# Patient Record
Sex: FEMALE | Race: WHITE | NOT HISPANIC OR LATINO | Employment: FULL TIME | URBAN - NONMETROPOLITAN AREA
[De-identification: names, ages, dates, MRNs, and addresses within clinical notes are randomized per-mention and may not be internally consistent; named-entity substitution may affect disease eponyms.]

---

## 2017-01-26 RX ORDER — TEMAZEPAM 15 MG/1
CAPSULE ORAL
Refills: 0 | OUTPATIENT
Start: 2017-01-26

## 2017-02-21 ENCOUNTER — OFFICE VISIT (OUTPATIENT)
Dept: URGENT CARE | Facility: PHYSICIAN GROUP | Age: 52
End: 2017-02-21
Payer: COMMERCIAL

## 2017-02-21 VITALS
SYSTOLIC BLOOD PRESSURE: 110 MMHG | HEIGHT: 68 IN | TEMPERATURE: 97.6 F | RESPIRATION RATE: 14 BRPM | WEIGHT: 163 LBS | HEART RATE: 110 BPM | BODY MASS INDEX: 24.71 KG/M2 | DIASTOLIC BLOOD PRESSURE: 70 MMHG | OXYGEN SATURATION: 98 %

## 2017-02-21 DIAGNOSIS — G47.09 SECONDARY INSOMNIA: ICD-10-CM

## 2017-02-21 DIAGNOSIS — M10.071 ACUTE IDIOPATHIC GOUT OF RIGHT FOOT: ICD-10-CM

## 2017-02-21 PROCEDURE — 99214 OFFICE O/P EST MOD 30 MIN: CPT | Performed by: FAMILY MEDICINE

## 2017-02-21 RX ORDER — METHYLPREDNISOLONE 4 MG/1
4 TABLET ORAL DAILY
Qty: 1 KIT | Refills: 0 | Status: SHIPPED | OUTPATIENT
Start: 2017-02-21 | End: 2018-07-10

## 2017-02-21 RX ORDER — TRAZODONE HYDROCHLORIDE 150 MG/1
150 TABLET ORAL NIGHTLY PRN
Qty: 20 TAB | Refills: 0 | Status: SHIPPED | OUTPATIENT
Start: 2017-02-21 | End: 2018-07-10

## 2017-02-21 ASSESSMENT — ENCOUNTER SYMPTOMS
NAUSEA: 0
ABDOMINAL PAIN: 0
VOMITING: 0
FEVER: 0

## 2017-02-21 NOTE — MR AVS SNAPSHOT
"        Louisa Nieto   2017 1:00 PM   Office Visit   MRN: [id number]    Department:  [facility]   Dept Phone:  [phone]    Description:  Female : 1965   Provider:  Kane Barcenas M.D.           Reason for Visit     Gout R/ foot      Allergies as of 2017     Allergen Noted Reactions    Aspirin 06/15/2013         You were diagnosed with     Secondary insomnia   [253107]       Acute idiopathic gout of right foot   [1269282]         Vital Signs     Blood Pressure Pulse Temperature Respirations Height Weight    110/70 mmHg 110 36.4 °C (97.6 °F) 14 1.727 m (5' 8\") 73.936 kg (163 lb)    Body Mass Index Oxygen Saturation Smoking Status             24.79 kg/m2 98% Never Smoker          Basic Information     Date Of Birth Sex Race Ethnicity Preferred Language    1965 Female White Non- English      Problem List              ICD-10-CM Priority Class Noted - Resolved    HTN (hypertension) I10   10/2/2013 - Present    Asthma in adult J45.909   10/2/2013 - Present    Asthma exacerbation J45.901   2014 - Present    Insomnia G47.00   2014 - Present    Allergic rhinitis due to allergen J30.9   3/23/2015 - Present    Sinusitis, acute J01.90   3/23/2015 - Present    Dental infection K04.7   2016 - Present      Health Maintenance        Date Due Completion Dates    IMM DTaP/Tdap/Td Vaccine (1 - Tdap) 1986 ---    IMM PNEUMOCOCCAL 19-64 (ADULT) MEDIUM RISK SERIES (1 of 1 - PPSV23) 1986 ---    MAMMOGRAM 10/2/2012 10/2/2011 (Prv Comp)    Override on 10/2/2011: Previously completed    IMM INFLUENZA (1) 2016 ---    PAP SMEAR 10/2/2016 10/2/2013 (Prv Comp)    Override on 10/2/2013: Previously completed (s/p hysterectomy)    COLONOSCOPY 2017 ---            Current Immunizations     No immunizations on file.      Below and/or attached are the medications your provider expects you to take. Review all of your home medications and newly ordered medications with " your provider and/or pharmacist. Follow medication instructions as directed by your provider and/or pharmacist. Please keep your medication list with you and share with your provider. Update the information when medications are discontinued, doses are changed, or new medications (including over-the-counter products) are added; and carry medication information at all times in the event of emergency situations     Allergies:  ASPIRIN - (reactions not documented)               Medications  Valid as of: February 21, 2017 -  1:42 PM    Generic Name Brand Name Tablet Size Instructions for use    Albuterol Sulfate (Nebu Soln) PROVENTIL 2.5mg/3ml 3 mL by Nebulization route every four hours as needed for Shortness of Breath.        Albuterol Sulfate (Aero Soln) albuterol 108 (90 BASE) MCG/ACT Inhale 2 Puffs by mouth every 6 hours as needed for Shortness of Breath.        Albuterol Sulfate (Aero Soln) albuterol 108 (90 BASE) MCG/ACT Inhale 2 Puffs by mouth every four hours as needed.        Amoxicillin-Pot Clavulanate (Tab) AUGMENTIN 875-125 MG Take 1 Tab by mouth 2 times a day.        Azithromycin (Tab) ZITHROMAX 250 MG Follow package directions        Cetirizine HCl (Tab) ZYRTEC 10 MG TAKE 1 TABLET BY MOUTH EVERY DAY        Fluticasone-Salmeterol (AEROSOL POWDER, BREATH ACTIVATED) ADVAIR 500-50 MCG/DOSE Inhale 1 Puff by mouth 2 times a day.        Guaifenesin-Codeine (Solution) ROBITUSSIN -10 mg/5mL Take 5 mL by mouth every four hours as needed for Cough.        MethylPREDNISolone (Tablet Therapy Pack) MEDROL DOSEPAK 4 MG Take 1 Tab by mouth every day.        Montelukast Sodium (Tab) SINGULAIR 10 MG Take 1 Tab by mouth every day.        Temazepam (Cap) RESTORIL 15 MG TAKE 1 CAPSULE BY MOUTH EVERY NIGHT AT BEDTIME AS NEEDED FOR SLEEP        TraZODone HCl (Tab) DESYREL 150 MG Take 1 Tab by mouth at bedtime as needed for Sleep.        Valsartan-Hydrochlorothiazide (Tab) DIOVAN--25 MG Take 1 Tab by mouth every day.         .                 Medicines prescribed today were sent to:     [id number] - [place], [place] - [address] [facility] [address]    [address] [place] [address]    Phone: [phone] Fax: [phone]    Open 24 Hours?: No    [id number] - [place], [place] - [address]    [address] [place] [address]    Phone: [phone] Fax: [phone]    Open 24 Hours?: No      Medication refill instructions:       If your prescription bottle indicates you have medication refills left, it is not necessary to call your provider’s office. Please contact your pharmacy and they will refill your medication.    If your prescription bottle indicates you do not have any refills left, you may request refills at any time through one of the following ways: The online [employer] system (except Urgent Care), by calling your provider’s office, or by asking your pharmacy to contact your provider’s office with a refill request. Medication refills are processed only during regular business hours and may not be available until the next business day. Your provider may request additional information or to have a follow-up visit with you prior to refilling your medication.   *Please Note: Medication refills are assigned a new Rx number when refilled electronically. Your pharmacy may indicate that no refills were authorized even though a new prescription for the same medication is available at the pharmacy. Please request the medicine by name with the pharmacy before contacting your provider for a refill.           [employer] Access Code: [email or web address]  Expires: 2/28/2017 10:18 AM    [employer]  A secure, online tool to manage your health information     [employer]’s [employer]® is a secure, online tool that connects you to your personalized health information from the privacy of your home -- day or night - making it very easy for you to manage your healthcare. Once the  activation process is completed, you can even access your medical information using the [facility] justin, which is available for free in the Apple Justin store or Google Play store.     [facility] provides the following levels of access (as shown below):   My Chart Features   [facility] Doctor [facility]  Specialists [facility]  Urgent  Care Non-[facility]  Primary Care  Doctor   Email your healthcare team securely and privately 24/7 X X X    Manage appointments: schedule your next appointment; view details of past/upcoming appointments X      Request prescription refills. X      View recent personal medical records, including lab and immunizations X X X X   View health record, including health history, allergies, medications X X X X   Read reports about your outpatient visits, procedures, consult and ER notes X X X X   See your discharge summary, which is a recap of your hospital and/or ER visit that includes your diagnosis, lab results, and care plan. X X       How to register for [facility]:  1. Go to  [email or web address].  2. Click on the Sign Up Now box, which takes you to the New Member Sign Up page. You will need to provide the following information:  a. Enter your [facility] Access Code exactly as it appears at the top of this page. (You will not need to use this code after you’ve completed the sign-up process. If you do not sign up before the expiration date, you must request a new code.)   b. Enter your date of birth.   c. Enter your home email address.   d. Click Submit, and follow the next screen’s instructions.  3. Create a [facility] ID. This will be your [facility] login ID and cannot be changed, so think of one that is secure and easy to remember.  4. Create a [facility] password. You can change your password at any time.  5. Enter your Password Reset Question and Answer. This can be used at a later time if you forget your password.   6. Enter your e-mail address. This allows you to receive e-mail notifications when new  information is available in [employer].  7. Click Sign Up. You can now view your health information.    For assistance activating your [employer] account, call [phone]

## 2017-02-21 NOTE — PROGRESS NOTES
Subjective:      Louisa Nieto is a 52 y.o. female who presents with Gout            Foot Problem  This is a recurrent problem. The current episode started in the past 7 days (3 days). The problem has been gradually worsening. Associated symptoms include a rash. Pertinent negatives include no abdominal pain, chest pain, fever, nausea or vomiting. The symptoms are aggravated by walking and standing. Treatments tried: pain pill from dental work. The treatment provided mild relief.       Review of Systems   Constitutional: Negative for fever.   Cardiovascular: Negative for chest pain.   Gastrointestinal: Negative for nausea, vomiting and abdominal pain.   Skin: Positive for rash.     PMH:  has a past medical history of HTN (hypertension) (10/2/2013); Asthma in adult (10/2/2013); URI (upper respiratory infection) (10/2/2013); and Gout.  MEDS:   Current outpatient prescriptions:   •  temazepam (RESTORIL) 15 MG Cap, TAKE 1 CAPSULE BY MOUTH EVERY NIGHT AT BEDTIME AS NEEDED FOR SLEEP, Disp: 30 Cap, Rfl: 0  •  azithromycin (ZITHROMAX) 250 MG Tab, Follow package directions, Disp: 6 Tab, Rfl: 0  •  MethylPREDNISolone (MEDROL DOSEPAK) 4 MG Tablet Therapy Pack, Take 1 Tab by mouth See Admin Instructions., Disp: 21 Tab, Rfl: 0  •  guaifenesin-codeine (CHERATUSSIN AC) Solution oral solution, Take 5 mL by mouth every four hours as needed for Cough., Disp: 120 mL, Rfl: 0  •  albuterol 108 (90 BASE) MCG/ACT Aero Soln inhalation aerosol, Inhale 2 Puffs by mouth every four hours as needed., Disp: 1 Inhaler, Rfl: 0  •  amoxicillin-clavulanate (AUGMENTIN) 875-125 MG Tab, Take 1 Tab by mouth 2 times a day., Disp: 20 Tab, Rfl: 0  •  montelukast (SINGULAIR) 10 MG Tab, Take 1 Tab by mouth every day., Disp: 30 Tab, Rfl: 11  •  fluticasone-salmeterol (ADVAIR DISKUS) 500-50 MCG/DOSE AEROSOL POWDER, BREATH ACTIVATED, Inhale 1 Puff by mouth 2 times a day., Disp: 3 Inhaler, Rfl: 3  •  valsartan-hydrochlorothiazide (DIOVAN-HCT) 160-25 MG  "per tablet, Take 1 Tab by mouth every day., Disp: 90 Tab, Rfl: 3  •  albuterol (VENTOLIN OR PROVENTIL) 108 (90 BASE) MCG/ACT Aero Soln inhalation aerosol, Inhale 2 Puffs by mouth every 6 hours as needed for Shortness of Breath., Disp: 2 Inhaler, Rfl: 3  •  cetirizine (ZYRTEC) 10 MG TABS, TAKE 1 TABLET BY MOUTH EVERY DAY, Disp: 30 Tab, Rfl: 11  •  albuterol (PROVENTIL) 2.5mg/3ml NEBU solution for nebulization, 3 mL by Nebulization route every four hours as needed for Shortness of Breath., Disp: 150 mL, Rfl: 3  ALLERGIES:   Allergies   Allergen Reactions   • Aspirin      SURGHX:   Past Surgical History   Procedure Laterality Date   • Primary c section       x1   • Appendectomy       SOCHX:  reports that she has never smoked. She has never used smokeless tobacco. She reports that she does not drink alcohol or use illicit drugs.  FH: Family history was reviewed, no pertinent findings to report       Objective:     /70 mmHg  Pulse 110  Temp(Src) 36.4 °C (97.6 °F)  Resp 14  Ht 1.727 m (5' 8\")  Wt 73.936 kg (163 lb)  BMI 24.79 kg/m2  SpO2 98%     Physical Exam   Constitutional: She appears well-developed. No distress.   Pulmonary/Chest: Effort normal.   Musculoskeletal:        Right ankle: She exhibits decreased range of motion and swelling. She exhibits no ecchymosis. Tenderness. No lateral malleolus, no medial malleolus, no AITFL, no CF ligament, no posterior TFL and no head of 5th metatarsal tenderness found.        Feet:    Skin: Skin is warm and dry. She is not diaphoretic. No erythema.   Psychiatric: She has a normal mood and affect. Her behavior is normal.     Antalgic gait          Assessment/Plan:     1. Secondary insomnia  trazodone (DESYREL) 150 MG Tab   2. Acute idiopathic gout of right foot  MethylPREDNISolone (MEDROL DOSEPAK) 4 MG Tablet Therapy Pack     Insomnia since death of family member earlier this week  Trazodone for sleep  F/u with PCP     Supportive care  Follow-up if symptoms worsen or " fail to improve

## 2017-05-23 ENCOUNTER — TELEPHONE (OUTPATIENT)
Dept: MEDICAL GROUP | Facility: PHYSICIAN GROUP | Age: 52
End: 2017-05-23

## 2017-05-23 DIAGNOSIS — J45.41 ASTHMA IN ADULT, MODERATE PERSISTENT, WITH ACUTE EXACERBATION: ICD-10-CM

## 2017-05-23 DIAGNOSIS — I10 ESSENTIAL HYPERTENSION: ICD-10-CM

## 2017-05-23 RX ORDER — VALSARTAN AND HYDROCHLOROTHIAZIDE 160; 25 MG/1; MG/1
1 TABLET ORAL
Qty: 90 TAB | Refills: 0 | Status: SHIPPED | OUTPATIENT
Start: 2017-05-23 | End: 2018-07-10 | Stop reason: SDUPTHER

## 2017-10-04 NOTE — TELEPHONE ENCOUNTER
Pt was made aware 8/17 that she needed to establish with new PCP prior to more refills. Med is denied. Pharmacy already made aware, please let pt know.

## 2017-10-11 NOTE — TELEPHONE ENCOUNTER
Phone Number Called: [phone] (home)     Message: M for pt. To call and schedule to establish care. Provided [phone] number to schedule.     Left Message for patient to call back: N/A

## 2017-11-29 ENCOUNTER — OFFICE VISIT (OUTPATIENT)
Dept: URGENT CARE | Facility: PHYSICIAN GROUP | Age: 52
End: 2017-11-29
Payer: COMMERCIAL

## 2017-11-29 VITALS
RESPIRATION RATE: 18 BRPM | HEIGHT: 68 IN | HEART RATE: 94 BPM | BODY MASS INDEX: 25.01 KG/M2 | SYSTOLIC BLOOD PRESSURE: 130 MMHG | OXYGEN SATURATION: 97 % | DIASTOLIC BLOOD PRESSURE: 84 MMHG | WEIGHT: 165 LBS | TEMPERATURE: 98.3 F

## 2017-11-29 DIAGNOSIS — H66.92 ACUTE OTITIS MEDIA, LEFT: ICD-10-CM

## 2017-11-29 PROCEDURE — 99214 OFFICE O/P EST MOD 30 MIN: CPT | Performed by: PHYSICIAN ASSISTANT

## 2017-11-29 RX ORDER — AMOXICILLIN AND CLAVULANATE POTASSIUM 875; 125 MG/1; MG/1
1 TABLET, FILM COATED ORAL 2 TIMES DAILY
Qty: 20 TAB | Refills: 0 | Status: SHIPPED | OUTPATIENT
Start: 2017-11-29 | End: 2017-12-09

## 2017-11-29 NOTE — PROGRESS NOTES
Chief Complaint   Patient presents with   • Otalgia     possible sinusitis, ear pain w/ drainage.       HISTORY OF PRESENT ILLNESS: Patient is a 52 y.o. female who presents today because she has left ear pain. She has had some nasal and sinus congestion, has had a recent surgery on her sinuses.    Left ear has been giving her a lot of trouble and pain over the last week. She has been using over-the-counter anti-inflammatories, uses her Advair, over-the-counter decongestants and cough and cold medications without improvement    Patient Active Problem List    Diagnosis Date Noted   • Dental infection 04/19/2016   • Allergic rhinitis due to allergen 03/23/2015   • Sinusitis, acute 03/23/2015   • Asthma exacerbation 11/13/2014   • Insomnia 11/13/2014   • HTN (hypertension) 10/02/2013   • Asthma in adult 10/02/2013       Allergies:Aspirin    Current Outpatient Prescriptions Ordered in [facility]   Medication Sig Dispense Refill   • amoxicillin-clavulanate (AUGMENTIN) 875-125 MG Tab Take 1 Tab by mouth 2 times a day for 10 days. 20 Tab 0   • ADVAIR DISKUS 500-50 MCG/DOSE AEROSOL POWDER, BREATH ACTIVATED INHALE 1 PUFF BY MOUTH TWICE DAILY 60 Inhaler 0   • valsartan-hydrochlorothiazide (DIOVAN-HCT) 160-25 MG per tablet Take 1 Tab by mouth every day. 90 Tab 0   • albuterol (PROVENTIL) 2.5mg/3ml NEBU solution for nebulization 3 mL by Nebulization route every four hours as needed for Shortness of Breath. 150 mL 3   • MethylPREDNISolone (MEDROL DOSEPAK) 4 MG Tablet Therapy Pack Take 1 Tab by mouth every day. 1 Kit 0   • trazodone (DESYREL) 150 MG Tab Take 1 Tab by mouth at bedtime as needed for Sleep. 20 Tab 0   • temazepam (RESTORIL) 15 MG Cap TAKE 1 CAPSULE BY MOUTH EVERY NIGHT AT BEDTIME AS NEEDED FOR SLEEP 30 Cap 0   • azithromycin (ZITHROMAX) 250 MG Tab Follow package directions 6 Tab 0   • guaifenesin-codeine (CHERATUSSIN AC) Solution oral solution Take 5 mL by mouth every four hours as needed for Cough. 120 mL 0   • albuterol  "108 (90 BASE) MCG/ACT Aero Soln inhalation aerosol Inhale 2 Puffs by mouth every four hours as needed. 1 Inhaler 0   • amoxicillin-clavulanate (AUGMENTIN) 875-125 MG Tab Take 1 Tab by mouth 2 times a day. 20 Tab 0   • montelukast (SINGULAIR) 10 MG Tab Take 1 Tab by mouth every day. 30 Tab 11   • albuterol (VENTOLIN OR PROVENTIL) 108 (90 BASE) MCG/ACT Aero Soln inhalation aerosol Inhale 2 Puffs by mouth every 6 hours as needed for Shortness of Breath. 2 Inhaler 3   • cetirizine (ZYRTEC) 10 MG TABS TAKE 1 TABLET BY MOUTH EVERY DAY 30 Tab 11     No current [facility]-ordered facility-administered medications on file.        Past Medical History:   Diagnosis Date   • Asthma in adult 10/2/2013   • Gout    • HTN (hypertension) 10/2/2013   • URI (upper respiratory infection) 10/2/2013    X 2 weeks Thick nasal congestion Can't breathe       Social History   Substance Use Topics   • Smoking status: Never Smoker   • Smokeless tobacco: Never Used   • Alcohol use No       Family Status   Relation Status   • Father    • Paternal Grandfather      Family History   Problem Relation Age of Onset   • Heart Disease Father    • Cancer Father      renal   • Heart Disease Paternal Grandfather        ROS:  Review of Systems   Constitutional: Negative for fever, chills, weight loss and malaise/fatigue.   HENT:Positive for left ear pain,No nosebleeds,Positive nasal congestion, no sore throat and neck pain.    Eyes: Negative for blurred vision.   Respiratory: Positive for cough, no sputum production, shortness of breath and wheezing.    Cardiovascular: Negative for chest pain, palpitations, orthopnea and leg swelling.   Gastrointestinal: Negative for heartburn, nausea, vomiting and abdominal pain.   Genitourinary: Negative for dysuria, urgency and frequency.     Exam:  Blood pressure 130/84, pulse 94, temperature 36.8 °C (98.3 °F), resp. rate 18, height 1.727 m (5' 8\"), weight 74.8 kg (165 lb), SpO2 97 %.  General:  Well nourished, well developed " female in NAD  Head:Normocephalic, atraumatic  Eyes: PERRLA, EOM within normal limits, no conjunctival injection, no scleral icterus, visual fields and acuity grossly intact.  Ears: Normal shape and symmetry, no tenderness, no discharge. External canals are without any significant edema or erythema. Tympanic membrane on the left is erythematous, bulging and dull, landmarks are not visible, there is thick yellow fluid behind the tympanic membrane with bubbles visible. Tympanic membrane on the right is without any inflammation, no effusion. Gross auditory acuity is intact  Nose: Symmetrical without tenderness, no discharge. Nasal mucosa is edematous bilaterally  Mouth: reasonable hygiene, no erythema exudates or tonsillar enlargement.  Neck: no masses, range of motion within normal limits, no tracheal deviation. No obvious thyroid enlargement.  Pulmonary: chest is symmetrical with respiration, no wheezes, crackles, or rhonchi.  Cardiovascular: regular rate and rhythm without murmurs, rubs, or gallops.  Extremities: no clubbing, cyanosis, or edema.    Please note that this dictation was created using voice recognition software. I have made every reasonable attempt to correct obvious errors, but I expect that there are errors of grammar and possibly content that I did not discover before finalizing the note.    Assessment/Plan:  1. Acute otitis media, left  amoxicillin-clavulanate (AUGMENTIN) 875-125 MG Tab   tylenol or ibuprofen as tolerated    Followup with primary care in the next 7-10 days if not significantly improving, return to the urgent care or go to the emergency room sooner for any worsening of symptoms.

## 2018-07-10 ENCOUNTER — OFFICE VISIT (OUTPATIENT)
Dept: MEDICAL GROUP | Facility: PHYSICIAN GROUP | Age: 53
End: 2018-07-10

## 2018-07-10 VITALS
HEIGHT: 68 IN | DIASTOLIC BLOOD PRESSURE: 98 MMHG | TEMPERATURE: 97.5 F | RESPIRATION RATE: 20 BRPM | SYSTOLIC BLOOD PRESSURE: 142 MMHG | OXYGEN SATURATION: 95 % | WEIGHT: 161.2 LBS | HEART RATE: 102 BPM | BODY MASS INDEX: 24.43 KG/M2

## 2018-07-10 DIAGNOSIS — F51.01 PRIMARY INSOMNIA: ICD-10-CM

## 2018-07-10 DIAGNOSIS — J32.0 CHRONIC MAXILLARY SINUSITIS: ICD-10-CM

## 2018-07-10 DIAGNOSIS — I10 ESSENTIAL HYPERTENSION: ICD-10-CM

## 2018-07-10 PROBLEM — J33.9 NASAL POLYPS: Status: ACTIVE | Noted: 2018-07-10

## 2018-07-10 PROCEDURE — 99213 OFFICE O/P EST LOW 20 MIN: CPT | Performed by: FAMILY MEDICINE

## 2018-07-10 RX ORDER — TEMAZEPAM 15 MG/1
15 CAPSULE ORAL NIGHTLY PRN
Qty: 30 CAP | Refills: 0 | Status: SHIPPED | OUTPATIENT
Start: 2018-07-10 | End: 2018-08-09

## 2018-07-10 RX ORDER — VALSARTAN AND HYDROCHLOROTHIAZIDE 160; 25 MG/1; MG/1
1 TABLET ORAL
Qty: 90 TAB | Refills: 1 | Status: SHIPPED | OUTPATIENT
Start: 2018-07-10 | End: 2018-12-28 | Stop reason: SDUPTHER

## 2018-07-10 ASSESSMENT — PATIENT HEALTH QUESTIONNAIRE - PHQ9: CLINICAL INTERPRETATION OF PHQ2 SCORE: 0

## 2018-07-10 NOTE — ASSESSMENT & PLAN NOTE
Gets Kenalog shots every 4 months at ENT. Uses flonase, Zyrtec daily, in addition to nasal irrigation with saline. Can't smell, taste anything anymore.

## 2018-07-10 NOTE — PROGRESS NOTES
Complaint: Establish care.     Subjective:     Louisa Nieto is a 53 y.o. female here today for establish care.    HTN (hypertension)  Did not take her BP med this morning. Has cuff at home. Says she is normally compliant.  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 Does not like doctors or doctor offices.    Insomnia  Needs refill Restoril.    Chronic maxillary sinusitis  Gets Kenalog shots every 4 months at ENT. Uses flonase, Zyrtec daily, in addition to nasal irrigation with saline. Can't smell, taste anything anymore.      No other concerns or complaints today.    Current medicines (including changes today)  Current Outpatient Prescriptions   Medication Sig Dispense Refill   • hdvxzbn-jwicpkizgz-oar (ROBITUSSIN CF) 30- MG/5ML Liquid Take 10 mL by mouth every four hours as needed.     • temazepam (RESTORIL) 15 MG Cap Take 1 Cap by mouth at bedtime as needed for Sleep for up to 30 days. SLEEP 30 Cap 0   • valsartan-hydrochlorothiazide (DIOVAN-HCT) 160-25 MG per tablet Take 1 Tab by mouth every day. 90 Tab 1   • ADVAIR DISKUS 500-50 MCG/DOSE AEROSOL POWDER, BREATH ACTIVATED INHALE 1 PUFF BY MOUTH TWICE DAILY 60 Inhaler 0   • albuterol 108 (90 BASE) MCG/ACT Aero Soln inhalation aerosol Inhale 2 Puffs by mouth every four hours as needed. 1 Inhaler 0   • albuterol (VENTOLIN OR PROVENTIL) 108 (90 BASE) MCG/ACT Aero Soln inhalation aerosol Inhale 2 Puffs by mouth every 6 hours as needed for Shortness of Breath. 2 Inhaler 3   • cetirizine (ZYRTEC) 10 MG TABS TAKE 1 TABLET BY MOUTH EVERY DAY 30 Tab 11   • albuterol (PROVENTIL) 2.5mg/3ml NEBU solution for nebulization 3 mL by Nebulization route every four hours as needed for Shortness of Breath. 150 mL 3     No current facility-administered medications for this visit.      She  has a past medical history of Asthma in adult (10/2/2013); Chronic sinusitis; Gout; HTN (hypertension) (10/2/2013); Hypertension; and URI (upper respiratory infection)  (10/2/2013).    Health Maintenance: UTD      Allergies: Aspirin    Current Outpatient Prescriptions Ordered in [facility]   Medication Sig Dispense Refill   • fmqokez-jebjrpzcah-prv (ROBITUSSIN CF) 30- MG/5ML Liquid Take 10 mL by mouth every four hours as needed.     • temazepam (RESTORIL) 15 MG Cap Take 1 Cap by mouth at bedtime as needed for Sleep for up to 30 days. SLEEP 30 Cap 0   • valsartan-hydrochlorothiazide (DIOVAN-HCT) 160-25 MG per tablet Take 1 Tab by mouth every day. 90 Tab 1   • ADVAIR DISKUS 500-50 MCG/DOSE AEROSOL POWDER, BREATH ACTIVATED INHALE 1 PUFF BY MOUTH TWICE DAILY 60 Inhaler 0   • albuterol 108 (90 BASE) MCG/ACT Aero Soln inhalation aerosol Inhale 2 Puffs by mouth every four hours as needed. 1 Inhaler 0   • albuterol (VENTOLIN OR PROVENTIL) 108 (90 BASE) MCG/ACT Aero Soln inhalation aerosol Inhale 2 Puffs by mouth every 6 hours as needed for Shortness of Breath. 2 Inhaler 3   • cetirizine (ZYRTEC) 10 MG TABS TAKE 1 TABLET BY MOUTH EVERY DAY 30 Tab 11   • albuterol (PROVENTIL) 2.5mg/3ml NEBU solution for nebulization 3 mL by Nebulization route every four hours as needed for Shortness of Breath. 150 mL 3     No current [facility]-ordered facility-administered medications on file.        Past Medical History:   Diagnosis Date   • Asthma in adult 10/2/2013   • Chronic sinusitis    • Gout    • HTN (hypertension) 10/2/2013   • Hypertension    • URI (upper respiratory infection) 10/2/2013    X 2 weeks Thick nasal congestion Can't breathe       Past Surgical History:   Procedure Laterality Date   • APPENDECTOMY     • PRIMARY C SECTION      x1       Social History   Substance Use Topics   • Smoking status: Never Smoker   • Smokeless tobacco: Never Used   • Alcohol use No       Social History     Social History Narrative   • No narrative on file       Family History   Problem Relation Age of Onset   • Heart Disease Father    • Cancer Father      renal   • Heart Disease Paternal Grandfather          ROS  "Positive for nasal congestion, postnasal drip, hoarse voice.  Patient denies any fever, chills, unintentional weight gain/loss, fatigue, stroke symptoms, dizziness, headache, sore-throat, cough, heartburn, chest pain, difficulty breathing, abdominal discomfort, diarrhea/constipation, burning with urination or frequency, joint or back pain, skin rashes, depression or anxiety.       Objective:     Blood pressure 142/98, pulse (!) 102, temperature 36.4 °C (97.5 °F), resp. rate 20, height 1.727 m (5' 8\"), weight 73.1 kg (161 lb 3.2 oz), SpO2 95 %. Body mass index is 24.51 kg/m².   Physical Exam:  Constitutional: Alert, no distress.  Psych: Alert and oriented x3, appropriate affect and mood.        Assessment and Plan:   The following treatment plan was discussed    1. Primary insomnia  Chronic problem. Pt made aware for need to comply with a CS agreement and be tested for illicit drug use.  - temazepam (RESTORIL) 15 MG Cap; Take 1 Cap by mouth at bedtime as needed for Sleep for up to 30 days. SLEEP  Dispense: 30 Cap; Refill: 0  - CONTROLLED SUBSTANCE TREATMENT AGREEMENT  - [facility] PAIN MANAGEMENT SCREEN; Future    2. Essential hypertension  Chronic problem. Needs to keep below 140/90. Monitor at home.  - valsartan-hydrochlorothiazide (DIOVAN-HCT) 160-25 MG per tablet; Take 1 Tab by mouth every day.  Dispense: 90 Tab; Refill: 1    3. Chronic maxillary sinusitis  Chronic problem. Ok to get Kenalog shots with us.    Will see me in [place] in future.    Followup: Return in about 3 months (around 10/10/2018).    Please note that this dictation was created using voice recognition software. I have made every reasonable attempt to correct obvious errors, but I expect that there are errors of grammar and possibly content that I did not discover before finalizing the note.           "

## 2018-07-19 ENCOUNTER — DOCUMENTATION (OUTPATIENT)
Dept: MEDICAL GROUP | Facility: PHYSICIAN GROUP | Age: 53
End: 2018-07-19

## 2018-07-19 NOTE — PROGRESS NOTES
[facility] contacted regarding a recollect. It was damaged during transport.    I contacted the pt, advised of the new collection. She states she is currently working, , and wont be back home until late this month. She states she will do this at [facility] or [facility]. I advised her that is ok, just to do this within the next month.

## 2018-08-10 ENCOUNTER — TELEPHONE (OUTPATIENT)
Dept: MEDICAL GROUP | Facility: PHYSICIAN GROUP | Age: 53
End: 2018-08-10

## 2018-08-10 RX ORDER — TEMAZEPAM 15 MG/1
15 CAPSULE ORAL NIGHTLY PRN
COMMUNITY
End: 2018-09-04 | Stop reason: SDUPTHER

## 2018-08-10 RX ORDER — TEMAZEPAM 15 MG/1
15 CAPSULE ORAL NIGHTLY PRN
Qty: 30 CAP | Refills: 0 | OUTPATIENT
Start: 2018-08-10 | End: 2018-09-09

## 2018-08-10 NOTE — TELEPHONE ENCOUNTER
Was the patient seen in the last year in this department? Yes    Does patient have an active prescription for medications requested? Yes    Received Request Via: Patient       Last visit: 7/10/18  Last labs:n/a    Waiting for patient to provide millenium specimen.

## 2018-08-10 NOTE — TELEPHONE ENCOUNTER
Patient came in to provide a millennium screen. Patients sample was inadequate. Patient states that she will be by sometime next week to try again.

## 2018-08-15 ENCOUNTER — OFFICE VISIT (OUTPATIENT)
Dept: URGENT CARE | Facility: PHYSICIAN GROUP | Age: 53
End: 2018-08-15
Payer: COMMERCIAL

## 2018-08-15 ENCOUNTER — HOSPITAL ENCOUNTER (OUTPATIENT)
Dept: LAB | Facility: MEDICAL CENTER | Age: 53
End: 2018-08-15
Attending: PHYSICIAN ASSISTANT
Payer: COMMERCIAL

## 2018-08-15 VITALS
SYSTOLIC BLOOD PRESSURE: 122 MMHG | HEART RATE: 68 BPM | BODY MASS INDEX: 24.13 KG/M2 | DIASTOLIC BLOOD PRESSURE: 68 MMHG | OXYGEN SATURATION: 97 % | RESPIRATION RATE: 12 BRPM | WEIGHT: 159.2 LBS | HEIGHT: 68 IN | TEMPERATURE: 97.4 F

## 2018-08-15 DIAGNOSIS — R53.83 OTHER FATIGUE: ICD-10-CM

## 2018-08-15 DIAGNOSIS — R51.9 NONINTRACTABLE HEADACHE, UNSPECIFIED CHRONICITY PATTERN, UNSPECIFIED HEADACHE TYPE: ICD-10-CM

## 2018-08-15 LAB
ANION GAP SERPL CALC-SCNC: 10 MMOL/L (ref 0–11.9)
BASOPHILS # BLD AUTO: 0.9 % (ref 0–1.8)
BASOPHILS # BLD: 0.05 K/UL (ref 0–0.12)
BUN SERPL-MCNC: 8 MG/DL (ref 8–22)
CALCIUM SERPL-MCNC: 9.3 MG/DL (ref 8.5–10.5)
CHLORIDE SERPL-SCNC: 94 MMOL/L (ref 96–112)
CO2 SERPL-SCNC: 29 MMOL/L (ref 20–33)
CREAT SERPL-MCNC: 0.97 MG/DL (ref 0.5–1.4)
EOSINOPHIL # BLD AUTO: 0.15 K/UL (ref 0–0.51)
EOSINOPHIL NFR BLD: 2.6 % (ref 0–6.9)
ERYTHROCYTE [DISTWIDTH] IN BLOOD BY AUTOMATED COUNT: 41.3 FL (ref 35.9–50)
GLUCOSE SERPL-MCNC: 130 MG/DL (ref 65–99)
HCT VFR BLD AUTO: 41.7 % (ref 37–47)
HGB BLD-MCNC: 13.8 G/DL (ref 12–16)
LYMPHOCYTES # BLD AUTO: 1.69 K/UL (ref 1–4.8)
LYMPHOCYTES NFR BLD: 30.1 % (ref 22–41)
MANUAL DIFF BLD: NORMAL
MCH RBC QN AUTO: 28.7 PG (ref 27–33)
MCHC RBC AUTO-ENTMCNC: 33.1 G/DL (ref 33.6–35)
MCV RBC AUTO: 86.7 FL (ref 81.4–97.8)
MONOCYTES # BLD AUTO: 0.35 K/UL (ref 0–0.85)
MONOCYTES NFR BLD AUTO: 6.2 % (ref 0–13.4)
MORPHOLOGY BLD-IMP: NORMAL
NEUTROPHILS # BLD AUTO: 3.37 K/UL (ref 2–7.15)
NEUTROPHILS NFR BLD: 53.1 % (ref 44–72)
NEUTS BAND NFR BLD MANUAL: 7.1 % (ref 0–10)
NRBC # BLD AUTO: 0 K/UL
NRBC BLD-RTO: 0 /100 WBC
PLATELET # BLD AUTO: 256 K/UL (ref 164–446)
PLATELET BLD QL SMEAR: NORMAL
PMV BLD AUTO: 9.9 FL (ref 9–12.9)
POTASSIUM SERPL-SCNC: 3.3 MMOL/L (ref 3.6–5.5)
RBC # BLD AUTO: 4.81 M/UL (ref 4.2–5.4)
RBC BLD AUTO: PRESENT
SODIUM SERPL-SCNC: 133 MMOL/L (ref 135–145)
TSH SERPL DL<=0.005 MIU/L-ACNC: 2.05 UIU/ML (ref 0.38–5.33)
VARIANT LYMPHS BLD QL SMEAR: NORMAL
WBC # BLD AUTO: 5.6 K/UL (ref 4.8–10.8)

## 2018-08-15 PROCEDURE — 86664 EPSTEIN-BARR NUCLEAR ANTIGEN: CPT

## 2018-08-15 PROCEDURE — 99214 OFFICE O/P EST MOD 30 MIN: CPT | Performed by: PHYSICIAN ASSISTANT

## 2018-08-15 PROCEDURE — 36415 COLL VENOUS BLD VENIPUNCTURE: CPT

## 2018-08-15 PROCEDURE — 86665 EPSTEIN-BARR CAPSID VCA: CPT | Mod: 91

## 2018-08-15 PROCEDURE — 85027 COMPLETE CBC AUTOMATED: CPT

## 2018-08-15 PROCEDURE — 85007 BL SMEAR W/DIFF WBC COUNT: CPT

## 2018-08-15 PROCEDURE — 80048 BASIC METABOLIC PNL TOTAL CA: CPT

## 2018-08-15 PROCEDURE — 84443 ASSAY THYROID STIM HORMONE: CPT

## 2018-08-15 PROCEDURE — 86663 EPSTEIN-BARR ANTIBODY: CPT

## 2018-08-15 ASSESSMENT — ENCOUNTER SYMPTOMS
SCALP TENDERNESS: 0
VOMITING: 0
INSOMNIA: 0
ABNORMAL BEHAVIOR: 0
WEAKNESS: 0
ABDOMINAL PAIN: 0
LOSS OF BALANCE: 0
BLURRED VISION: 0
SORE THROAT: 0
EYE PAIN: 0
NERVOUS/ANXIOUS: 1
DIARRHEA: 0
WHEEZING: 0
EYE DISCHARGE: 0
NECK PAIN: 1
HEADACHES: 1
PHOTOPHOBIA: 0
CHILLS: 1
SINUS PRESSURE: 1
FALLS: 0
FEVER: 0
DOUBLE VISION: 0
EYE REDNESS: 0
COUGH: 0
SHORTNESS OF BREATH: 0

## 2018-08-15 NOTE — PROGRESS NOTES
Subjective:      Louisa Nieto is a 53 y.o. female who presents with Headache and Neck Pain            Patient is a 53-year-old female who presents with intermittent fatigue, hot flashes and intermittent headaches that began with the back of her head with radiation to the top portion of her head.  Patient reports the headaches usually are first thing in the morning however after taking one Tylenol this improves.  Patient denies any recent change to her vision, vomiting, diarrhea or noted rashes.  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  Patient was with her daughter today who also provides history.        Headache    This is a new problem. The current episode started 1 to 4 weeks ago. The problem occurs intermittently. The problem has been waxing and waning. The pain is located in the occipital region. Radiates to: Top of head. The pain quality is not similar to prior headaches. The quality of the pain is described as aching. The pain is moderate. Associated symptoms include neck pain and sinus pressure. Pertinent negatives include no abdominal pain, abnormal behavior, blurred vision, coughing, ear pain, eye pain, eye redness, fever, insomnia, loss of balance, photophobia, scalp tenderness, sore throat, vomiting or weakness. Associated symptoms comments: Positive for stress and fatigue.. Nothing aggravates the symptoms. She has tried acetaminophen for the symptoms. The treatment provided mild relief. Her past medical history is significant for sinus disease. There is no history of recent head traumas.   Neck Pain    Associated symptoms include headaches. Pertinent negatives include no fever, photophobia or weakness.       Review of Systems   Constitutional: Positive for chills and malaise/fatigue. Negative for fever.   HENT: Positive for sinus pressure.  "Negative for congestion, ear pain and sore throat.    Eyes: Negative for blurred vision, double vision, photophobia, pain, discharge and redness.   Respiratory: Negative for cough, shortness of breath and wheezing.    Gastrointestinal: Negative for abdominal pain, diarrhea and vomiting.   Musculoskeletal: Positive for neck pain. Negative for falls and joint pain.   Skin: Negative for itching and rash.   Neurological: Positive for headaches. Negative for weakness and loss of balance.   Psychiatric/Behavioral: The patient is nervous/anxious. The patient does not have insomnia.    All other systems reviewed and are negative.         Objective:     /68   Pulse 68   Temp 36.3 °C (97.4 °F)   Resp 12   Ht 1.727 m (5' 8\")   Wt 72.2 kg (159 lb 3.2 oz)   SpO2 97%   BMI 24.21 kg/m²    PMH:  has a past medical history of Asthma in adult (10/2/2013); Chronic sinusitis; Gout; HTN (hypertension) (10/2/2013); Hypertension; and URI (upper respiratory infection) (10/2/2013).  MEDS:   Current Outpatient Prescriptions:   •  valsartan-hydrochlorothiazide (DIOVAN-HCT) 160-25 MG per tablet, Take 1 Tab by mouth every day., Disp: 90 Tab, Rfl: 1  •  ADVAIR DISKUS 500-50 MCG/DOSE AEROSOL POWDER, BREATH ACTIVATED, INHALE 1 PUFF BY MOUTH TWICE DAILY, Disp: 60 Inhaler, Rfl: 0  •  temazepam (RESTORIL) 15 MG Cap, Take 15 mg by mouth at bedtime as needed for Sleep., Disp: , Rfl:   •  jrcqgza-lklsgbodmx-pzb (ROBITUSSIN CF) 30- MG/5ML Liquid, Take 10 mL by mouth every four hours as needed., Disp: , Rfl:   •  albuterol 108 (90 BASE) MCG/ACT Aero Soln inhalation aerosol, Inhale 2 Puffs by mouth every four hours as needed., Disp: 1 Inhaler, Rfl: 0  •  albuterol (VENTOLIN OR PROVENTIL) 108 (90 BASE) MCG/ACT Aero Soln inhalation aerosol, Inhale 2 Puffs by mouth every 6 hours as needed for Shortness of Breath., Disp: 2 Inhaler, Rfl: 3  •  cetirizine (ZYRTEC) 10 MG TABS, TAKE 1 TABLET BY MOUTH EVERY DAY, Disp: 30 Tab, Rfl: 11  •  " albuterol (PROVENTIL) 2.5mg/3ml NEBU solution for nebulization, 3 mL by Nebulization route every four hours as needed for Shortness of Breath., Disp: 150 mL, Rfl: 3  ALLERGIES:   Allergies   Allergen Reactions   • Aspirin      SURGHX:   Past Surgical History:   Procedure Laterality Date   • APPENDECTOMY     • PRIMARY C SECTION      x1     SOCHX:  reports that she has never smoked. She has never used smokeless tobacco. She reports that she does not drink alcohol or use drugs.  FH: Family history was reviewed, no pertinent findings to report    Physical Exam   Constitutional: She is oriented to person, place, and time. She appears well-developed and well-nourished.   HENT:   Head: Normocephalic and atraumatic.   Right Ear: External ear normal.   Left Ear: External ear normal.   Nose: Nose normal.   Mouth/Throat: Oropharynx is clear and moist. No oropharyngeal exudate.   Eyes: Pupils are equal, round, and reactive to light. EOM are normal.   Neck: Normal range of motion. Neck supple.   Cardiovascular: Normal rate and regular rhythm.    No murmur heard.  Pulmonary/Chest: Effort normal and breath sounds normal. No respiratory distress.   Musculoskeletal: Normal range of motion. She exhibits no edema.   Lymphadenopathy:     She has no cervical adenopathy.   Neurological: She is alert and oriented to person, place, and time. She displays normal reflexes. No cranial nerve deficit. She exhibits normal muscle tone. Coordination normal.   Neg. Finger to nose, neg. Pronator drift, neg. Rhomberg. CHANTAL's appropriate. Gait steady.    Skin: Skin is warm. No rash noted.   Psychiatric: She has a normal mood and affect. Her behavior is normal.   Vitals reviewed.              Assessment/Plan:     1. Other fatigue  - CBC WITH DIFFERENTIAL; Future  - TSH WITH REFLEX TO FT4; Future  - BASIC METABOLIC PANEL; Future  - EBV ACUTE INFECTION AB PANEL; Future    2. Nonintractable headache, unspecified chronicity pattern, unspecified headache  type      I discussed my concern with patient's new onset of new type of headaches.  CT imaging was discussed today for further evaluation-patient declined and would like to monitor symptoms at this time.  Patient was very nervous today being in clinic of which I encouraged her today to monitor symptoms, consider stress relief therapies.  I will order labs today and noted that it will take a few days for him to return.  Patient would like for me to contact her via my chart.  Patient exam is completely benign today although will rule out anemia, abnormalities with patient's thyroid other electrolyte abnormalities are patient was specifically worried about mono or Kaelyn-Barr today.  Will order such and will follow up with this patient.    Patient given precautionary s/sx that mandate immediate follow up and evaluation in the ED. Advised of risks of not doing so.    DDX, Supportive care, and indications for immediate follow-up discussed with patient.    Instructed to return to clinic or nearest emergency department if we are not available for any change in condition, further concerns, or worsening of symptoms.    The patient demonstrated a good understanding and agreed with the treatment plan.  Please note that this dictation was created using voice recognition software. I have made every reasonable attempt to correct obvious errors, but I expect that there are errors of grammar and possibly content that I did not discover before finalizing the note.

## 2018-08-17 LAB
EBV EA-D IGG SER-ACNC: <5 U/ML (ref 0–10.9)
EBV NA IGG SER IA-ACNC: 502 U/ML (ref 0–21.9)
EBV VCA IGG SER IA-ACNC: 560 U/ML (ref 0–21.9)
EBV VCA IGM SER IA-ACNC: 60.2 U/ML (ref 0–43.9)

## 2018-10-03 ENCOUNTER — TELEPHONE (OUTPATIENT)
Dept: MEDICAL GROUP | Facility: PHYSICIAN GROUP | Age: 53
End: 2018-10-03

## 2018-10-03 NOTE — TELEPHONE ENCOUNTER
Called and informed patient in order for her to get her medication refilled she would need to come in for a visit.she stated she would call back to schedule.

## 2018-11-02 ENCOUNTER — OFFICE VISIT (OUTPATIENT)
Dept: MEDICAL GROUP | Facility: CLINIC | Age: 53
End: 2018-11-02
Payer: COMMERCIAL

## 2018-11-02 VITALS
RESPIRATION RATE: 14 BRPM | HEART RATE: 114 BPM | OXYGEN SATURATION: 94 % | WEIGHT: 153 LBS | BODY MASS INDEX: 23.19 KG/M2 | DIASTOLIC BLOOD PRESSURE: 78 MMHG | SYSTOLIC BLOOD PRESSURE: 118 MMHG | TEMPERATURE: 98.6 F | HEIGHT: 68 IN

## 2018-11-02 DIAGNOSIS — F51.01 PRIMARY INSOMNIA: ICD-10-CM

## 2018-11-02 DIAGNOSIS — J06.9 VIRAL UPPER RESPIRATORY TRACT INFECTION: ICD-10-CM

## 2018-11-02 PROCEDURE — 99213 OFFICE O/P EST LOW 20 MIN: CPT | Performed by: FAMILY MEDICINE

## 2018-11-02 RX ORDER — TEMAZEPAM 15 MG/1
15 CAPSULE ORAL NIGHTLY PRN
COMMUNITY
End: 2018-11-02 | Stop reason: SDUPTHER

## 2018-11-02 RX ORDER — AZITHROMYCIN 250 MG/1
TABLET, FILM COATED ORAL
Qty: 6 TAB | Refills: 0 | Status: SHIPPED | OUTPATIENT
Start: 2018-11-02 | End: 2019-02-13

## 2018-11-02 RX ORDER — TEMAZEPAM 15 MG/1
15 CAPSULE ORAL NIGHTLY PRN
Qty: 30 CAP | Refills: 0 | Status: SHIPPED | OUTPATIENT
Start: 2019-01-02 | End: 2018-12-06 | Stop reason: SDUPTHER

## 2018-11-02 RX ORDER — TEMAZEPAM 15 MG/1
15 CAPSULE ORAL NIGHTLY PRN
Qty: 30 CAP | Refills: 0 | Status: SHIPPED | OUTPATIENT
Start: 2018-11-02 | End: 2018-11-02 | Stop reason: SDUPTHER

## 2018-11-02 RX ORDER — TEMAZEPAM 15 MG/1
15 CAPSULE ORAL NIGHTLY PRN
Qty: 30 CAP | Refills: 0 | Status: SHIPPED | OUTPATIENT
Start: 2018-12-02 | End: 2018-11-02 | Stop reason: SDUPTHER

## 2018-11-02 NOTE — ASSESSMENT & PLAN NOTE
Has been battling sinus infection for past few weeks. Thick nasal discharge, postnasal drip, cough productive of discolored mucus. Has not need her asthma meds more, no increase in wheezing.

## 2018-11-02 NOTE — PROGRESS NOTES
Complaint: Refill Restoril, on-going sinus infection.     Subjective:     Louisa Nieto is a 53 y.o. female here today for f/u. Failed drug test at work because she forgot to disclose her temazepam. Working again after being cleared.    Insomnia  Here for refill temazepam. Take intermittently for shift work. .     Viral upper respiratory tract infection  Has been battling sinus infection for past few weeks. Thick nasal discharge, postnasal drip, cough productive of discolored mucus. Has not need her asthma meds more, no increase in wheezing.     No other concerns or complaints.    Current medicines (including changes today)  Current Outpatient Prescriptions   Medication Sig Dispense Refill   • [START ON 1/2/2019] temazepam (RESTORIL) 15 MG Cap Take 1 Cap by mouth at bedtime as needed for Sleep for up to 30 days. 30 Cap 0   • azithromycin (ZITHROMAX) 250 MG Tab Take as directed 6 Tab 0   • valsartan-hydrochlorothiazide (DIOVAN-HCT) 160-25 MG per tablet Take 1 Tab by mouth every day. 90 Tab 1   • ADVAIR DISKUS 500-50 MCG/DOSE AEROSOL POWDER, BREATH ACTIVATED INHALE 1 PUFF BY MOUTH TWICE DAILY 60 Inhaler 0   • cetirizine (ZYRTEC) 10 MG TABS TAKE 1 TABLET BY MOUTH EVERY DAY 30 Tab 11   • albuterol (VENTOLIN OR PROVENTIL) 108 (90 BASE) MCG/ACT Aero Soln inhalation aerosol Inhale 2 Puffs by mouth every 6 hours as needed for Shortness of Breath. 2 Inhaler 3   • albuterol (PROVENTIL) 2.5mg/3ml NEBU solution for nebulization 3 mL by Nebulization route every four hours as needed for Shortness of Breath. 150 mL 3     No current facility-administered medications for this visit.      She  has a past medical history of Asthma in adult (10/2/2013); Chronic sinusitis; Gout; HTN (hypertension) (10/2/2013); Hypertension; and URI (upper respiratory infection) (10/2/2013).    Health Maintenance: UTD      Allergies: Aspirin    Current Outpatient Prescriptions Ordered in [facility]   Medication Sig Dispense Refill   • [START ON  1/2/2019] temazepam (RESTORIL) 15 MG Cap Take 1 Cap by mouth at bedtime as needed for Sleep for up to 30 days. 30 Cap 0   • azithromycin (ZITHROMAX) 250 MG Tab Take as directed 6 Tab 0   • valsartan-hydrochlorothiazide (DIOVAN-HCT) 160-25 MG per tablet Take 1 Tab by mouth every day. 90 Tab 1   • ADVAIR DISKUS 500-50 MCG/DOSE AEROSOL POWDER, BREATH ACTIVATED INHALE 1 PUFF BY MOUTH TWICE DAILY 60 Inhaler 0   • cetirizine (ZYRTEC) 10 MG TABS TAKE 1 TABLET BY MOUTH EVERY DAY 30 Tab 11   • albuterol (VENTOLIN OR PROVENTIL) 108 (90 BASE) MCG/ACT Aero Soln inhalation aerosol Inhale 2 Puffs by mouth every 6 hours as needed for Shortness of Breath. 2 Inhaler 3   • albuterol (PROVENTIL) 2.5mg/3ml NEBU solution for nebulization 3 mL by Nebulization route every four hours as needed for Shortness of Breath. 150 mL 3     No current [facility]-ordered facility-administered medications on file.        Past Medical History:   Diagnosis Date   • Asthma in adult 10/2/2013   • Chronic sinusitis    • Gout    • HTN (hypertension) 10/2/2013   • Hypertension    • URI (upper respiratory infection) 10/2/2013    X 2 weeks Thick nasal congestion Can't breathe       Past Surgical History:   Procedure Laterality Date   • APPENDECTOMY     • PRIMARY C SECTION      x1       Social History   Substance Use Topics   • Smoking status: Never Smoker   • Smokeless tobacco: Never Used   • Alcohol use No       Social History     Social History Narrative   • No narrative on file       Family History   Problem Relation Age of Onset   • Heart Disease Father    • Cancer Father         renal   • Heart Disease Paternal Grandfather          ROS Positive for difficulty sleeping after night shifts, sinus congestion, postnasal drip, hoarseness.  Patient denies any fever, chills, unintentional weight gain/loss, fatigue, stroke symptoms, dizziness, headache, sore-throat, heartburn, chest pain, difficulty breathing, abdominal discomfort, diarrhea/constipation, burning with  "urination or frequency, joint or back pain, skin rashes, depression or anxiety.       Objective:     Blood pressure 118/78, pulse (!) 114, temperature 37 °C (98.6 °F), resp. rate 14, height 1.727 m (5' 8\"), weight 69.4 kg (153 lb), SpO2 94 %. Body mass index is 23.26 kg/m².   Physical Exam:  Constitutional: Alert, no distress. Hoarse voice.  Skin: Warm, dry, good turgor, no rashes in visible areas.  Eye: Equal, round and reactive, conjunctiva clear, lids normal.  ENMT: Lips without lesions, good dentition, oropharynx clear. Nares congested, scanty thick secretions.  Neck: Trachea midline, no masses, no thyromegaly. No cervical or supraclavicular lymphadenopathy  Respiratory: Unlabored respiratory effort, lungs clear to auscultation, no wheezes, no ronchi.  Cardiovascular: Normal S1, S2, no murmur, no extremity edema.  Psych: Alert and oriented x3, appropriate affect and mood.        Assessment and Plan:   The following treatment plan was discussed    1. Primary insomnia  Chronic problem.  - temazepam (RESTORIL) 15 MG Cap; Take 1 Cap by mouth at bedtime as needed for Sleep for up to 30 days.  Dispense: 30 Cap; Refill: 2 refills dated.    2. Viral upper respiratory tract infection/sinusitis  Ongoing problem. Recommend otc Mucinex DM, push fluids. Continue Zyrtec  - azithromycin (ZITHROMAX) 250 MG Tab; Take as directed  Dispense: 6 Tab; Refill: 0      Followup: Return in about 3 months (around 2/2/2019).    Please note that this dictation was created using voice recognition software. I have made every reasonable attempt to correct obvious errors, but I expect that there are errors of grammar and possibly content that I did not discover before finalizing the note.           "

## 2018-12-06 DIAGNOSIS — F51.01 PRIMARY INSOMNIA: ICD-10-CM

## 2018-12-06 RX ORDER — TEMAZEPAM 15 MG/1
15 CAPSULE ORAL NIGHTLY PRN
Qty: 30 CAP | Refills: 0 | Status: SHIPPED | OUTPATIENT
Start: 2019-01-02 | End: 2019-01-08 | Stop reason: SDUPTHER

## 2018-12-06 NOTE — TELEPHONE ENCOUNTER
Was the patient seen in the last year in this department? Yes    Does patient have an active prescription for medications requested? Yes    Received Request Via: Pharmacy    Last Visit: 11/2/18  Last Lab: 8/15/18

## 2019-01-08 DIAGNOSIS — F51.01 PRIMARY INSOMNIA: ICD-10-CM

## 2019-01-08 RX ORDER — TEMAZEPAM 15 MG/1
15 CAPSULE ORAL NIGHTLY PRN
Qty: 30 CAP | Refills: 0 | Status: SHIPPED | OUTPATIENT
Start: 2019-01-08 | End: 2019-02-07

## 2019-02-13 ENCOUNTER — OFFICE VISIT (OUTPATIENT)
Dept: MEDICAL GROUP | Facility: CLINIC | Age: 54
End: 2019-02-13
Payer: COMMERCIAL

## 2019-02-13 VITALS
RESPIRATION RATE: 14 BRPM | TEMPERATURE: 98 F | SYSTOLIC BLOOD PRESSURE: 122 MMHG | BODY MASS INDEX: 23.54 KG/M2 | OXYGEN SATURATION: 92 % | WEIGHT: 150 LBS | HEART RATE: 113 BPM | HEIGHT: 67 IN | DIASTOLIC BLOOD PRESSURE: 78 MMHG

## 2019-02-13 DIAGNOSIS — J06.9 VIRAL UPPER RESPIRATORY TRACT INFECTION: ICD-10-CM

## 2019-02-13 DIAGNOSIS — J32.0 CHRONIC MAXILLARY SINUSITIS: ICD-10-CM

## 2019-02-13 DIAGNOSIS — F51.01 PRIMARY INSOMNIA: ICD-10-CM

## 2019-02-13 DIAGNOSIS — R05.9 COUGH: ICD-10-CM

## 2019-02-13 PROCEDURE — 99213 OFFICE O/P EST LOW 20 MIN: CPT | Performed by: FAMILY MEDICINE

## 2019-02-13 RX ORDER — TEMAZEPAM 15 MG/1
15 CAPSULE ORAL NIGHTLY PRN
Qty: 30 CAP | Refills: 0 | Status: SHIPPED | OUTPATIENT
Start: 2019-02-13 | End: 2019-02-13 | Stop reason: SDUPTHER

## 2019-02-13 RX ORDER — BENZONATATE 100 MG/1
100 CAPSULE ORAL 3 TIMES DAILY PRN
Qty: 60 CAP | Refills: 0 | Status: SHIPPED | OUTPATIENT
Start: 2019-02-13 | End: 2019-03-29

## 2019-02-13 RX ORDER — TEMAZEPAM 15 MG/1
15 CAPSULE ORAL NIGHTLY PRN
Qty: 30 CAP | Refills: 0 | Status: SHIPPED | OUTPATIENT
Start: 2019-03-13 | End: 2019-02-13 | Stop reason: SDUPTHER

## 2019-02-13 RX ORDER — TEMAZEPAM 15 MG/1
15 CAPSULE ORAL NIGHTLY PRN
Qty: 30 CAP | Refills: 0 | Status: SHIPPED | OUTPATIENT
Start: 2019-04-13 | End: 2019-05-13

## 2019-02-13 RX ORDER — AZITHROMYCIN 250 MG/1
TABLET, FILM COATED ORAL
Qty: 6 TAB | Refills: 0 | Status: SHIPPED | OUTPATIENT
Start: 2019-02-13 | End: 2019-03-29

## 2019-02-13 NOTE — PROGRESS NOTES
Complaint: Cough, refill temazepam     Subjective:     Louisa Nieto is a 54 y.o. female here today for f/u.    Cough  Coughing up thick phelgm, hurts to breath. Got Kenalog shot at allergist's last week. Taking Robitussin without much relief. Wants antibiotic again.    Insomnia  Needs refill temazepam. Tries not to take a couple days/week, says it otherwise doesn't work when she needs it.Also, gave inadequate urine sample for Millenium in recent past, understands she needs to repeat.         Current medicines (including changes today)  Current Outpatient Prescriptions   Medication Sig Dispense Refill   • [START ON 4/13/2019] temazepam (RESTORIL) 15 MG Cap Take 1 Cap by mouth at bedtime as needed for Sleep for up to 30 doses. 30 Cap 0   • benzonatate (TESSALON) 100 MG Cap Take 1 Cap by mouth 3 times a day as needed for Cough. 60 Cap 0   • azithromycin (ZITHROMAX) 250 MG Tab Take as directed 6 Tab 0   • valsartan-hydrochlorothiazide (DIOVAN-HCT) 160-25 MG per tablet TAKE 1 TABLET BY MOUTH EVERY DAY 90 Tab 0   • ADVAIR DISKUS 500-50 MCG/DOSE AEROSOL POWDER, BREATH ACTIVATED INHALE 1 PUFF BY MOUTH TWICE DAILY 60 Inhaler 0   • albuterol (VENTOLIN OR PROVENTIL) 108 (90 BASE) MCG/ACT Aero Soln inhalation aerosol Inhale 2 Puffs by mouth every 6 hours as needed for Shortness of Breath. 2 Inhaler 3   • cetirizine (ZYRTEC) 10 MG TABS TAKE 1 TABLET BY MOUTH EVERY DAY 30 Tab 11   • albuterol (PROVENTIL) 2.5mg/3ml NEBU solution for nebulization 3 mL by Nebulization route every four hours as needed for Shortness of Breath. 150 mL 3     No current facility-administered medications for this visit.      She  has a past medical history of Asthma in adult (10/2/2013); Chronic sinusitis; Gout; HTN (hypertension) (10/2/2013); Hypertension; and URI (upper respiratory infection) (10/2/2013).    Health Maintenance: UTD      Allergies: Aspirin    Current Outpatient Prescriptions Ordered in [employer]   Medication Sig Dispense Refill   •  [START ON 4/13/2019] temazepam (RESTORIL) 15 MG Cap Take 1 Cap by mouth at bedtime as needed for Sleep for up to 30 doses. 30 Cap 0   • benzonatate (TESSALON) 100 MG Cap Take 1 Cap by mouth 3 times a day as needed for Cough. 60 Cap 0   • azithromycin (ZITHROMAX) 250 MG Tab Take as directed 6 Tab 0   • valsartan-hydrochlorothiazide (DIOVAN-HCT) 160-25 MG per tablet TAKE 1 TABLET BY MOUTH EVERY DAY 90 Tab 0   • ADVAIR DISKUS 500-50 MCG/DOSE AEROSOL POWDER, BREATH ACTIVATED INHALE 1 PUFF BY MOUTH TWICE DAILY 60 Inhaler 0   • albuterol (VENTOLIN OR PROVENTIL) 108 (90 BASE) MCG/ACT Aero Soln inhalation aerosol Inhale 2 Puffs by mouth every 6 hours as needed for Shortness of Breath. 2 Inhaler 3   • cetirizine (ZYRTEC) 10 MG TABS TAKE 1 TABLET BY MOUTH EVERY DAY 30 Tab 11   • albuterol (PROVENTIL) 2.5mg/3ml NEBU solution for nebulization 3 mL by Nebulization route every four hours as needed for Shortness of Breath. 150 mL 3     No current [facility]-ordered facility-administered medications on file.        Past Medical History:   Diagnosis Date   • Asthma in adult 10/2/2013   • Chronic sinusitis    • Gout    • HTN (hypertension) 10/2/2013   • Hypertension    • URI (upper respiratory infection) 10/2/2013    X 2 weeks Thick nasal congestion Can't breathe       Past Surgical History:   Procedure Laterality Date   • APPENDECTOMY     • PRIMARY C SECTION      x1       Social History   Substance Use Topics   • Smoking status: Never Smoker   • Smokeless tobacco: Never Used   • Alcohol use No       Social History     Social History Narrative   • No narrative on file       Family History   Problem Relation Age of Onset   • Heart Disease Father    • Cancer Father         renal   • Heart Disease Paternal Grandfather          ROS Positive for productive cough, shortness of breath, nasal congestion, trouble sleeping.  Patient denies any fever, chills, unintentional weight gain/loss, fatigue, stroke symptoms, dizziness, headache, sore-throat,  "cough, heartburn, chest pain,  abdominal discomfort, diarrhea/constipation, burning with urination or frequency, joint or back pain, skin rashes, depression or anxiety.       Objective:     Blood pressure 122/78, pulse (!) 113, temperature 36.7 °C (98 °F), temperature source Temporal, resp. rate 14, height 1.702 m (5' 7\"), weight 68 kg (150 lb), SpO2 92 %. Body mass index is 23.49 kg/m².   Physical Exam:  Constitutional: Alert, no distress.  Eye: Equal, round and reactive, conjunctiva clear, lids normal.  ENMT: Lips without lesions, good dentition, oropharynx clear.  Neck: Trachea midline, no masses, no thyromegaly. No cervical or supraclavicular lymphadenopathy  Respiratory: Unlabored respiratory effort, lungs clear to auscultation, no wheezes, positive for ronchi.  Cardiovascular: Normal S1, S2, no murmur, no extremity edema.          Assessment and Plan:   The following treatment plan was discussed    1. Primary insomnia  Chronic problem.  - temazepam (RESTORIL) 15 MG Cap; Take 1 Cap by mouth at bedtime as needed for Sleep for up to 30 doses.  Dispense: 30 Cap; Refill: 0  - Pain Management Screen; Future    2. Cough  Encouraged to take otc Mucinex as well. Push fluids to loosen secretions.  - benzonatate (TESSALON) 100 MG Cap; Take 1 Cap by mouth 3 times a day as needed for Cough.  Dispense: 60 Cap; Refill: 0  - azithromycin (ZITHROMAX) 250 MG Tab; Take as directed  Dispense: 6 Tab; Refill: 0    Demonstrated proper use of Pulmicort Turbohaler.    Followup: Return in about 3 months (around 5/13/2019).    Please note that this dictation was created using voice recognition software. I have made every reasonable attempt to correct obvious errors, but I expect that there are errors of grammar and possibly content that I did not discover before finalizing the note.           "

## 2019-02-13 NOTE — ASSESSMENT & PLAN NOTE
Coughing up thick phelgm, hurts to breath. Got Kenalog shot at allergist's last week. Taking Robitussin without much relief. Wants antibiotic again.

## 2019-02-13 NOTE — ASSESSMENT & PLAN NOTE
Needs refill temazepam. Also, gave inadequate urine sample for Millenium in recent past, understands she needs to repeat.

## 2019-03-29 ENCOUNTER — OFFICE VISIT (OUTPATIENT)
Dept: MEDICAL GROUP | Facility: CLINIC | Age: 54
End: 2019-03-29
Payer: COMMERCIAL

## 2019-03-29 ENCOUNTER — HOSPITAL ENCOUNTER (OUTPATIENT)
Facility: MEDICAL CENTER | Age: 54
End: 2019-03-29
Attending: FAMILY MEDICINE
Payer: COMMERCIAL

## 2019-03-29 VITALS
DIASTOLIC BLOOD PRESSURE: 78 MMHG | OXYGEN SATURATION: 94 % | TEMPERATURE: 97.3 F | SYSTOLIC BLOOD PRESSURE: 122 MMHG | RESPIRATION RATE: 14 BRPM | WEIGHT: 153 LBS | BODY MASS INDEX: 24.01 KG/M2 | HEIGHT: 67 IN | HEART RATE: 108 BPM

## 2019-03-29 DIAGNOSIS — K64.8 OTHER HEMORRHOIDS: ICD-10-CM

## 2019-03-29 DIAGNOSIS — F51.01 PRIMARY INSOMNIA: ICD-10-CM

## 2019-03-29 DIAGNOSIS — R30.0 DYSURIA: ICD-10-CM

## 2019-03-29 DIAGNOSIS — I10 ESSENTIAL HYPERTENSION: ICD-10-CM

## 2019-03-29 PROBLEM — R05.9 COUGH: Status: RESOLVED | Noted: 2019-02-13 | Resolved: 2019-03-29

## 2019-03-29 LAB
APPEARANCE UR: CLEAR
BILIRUB UR STRIP-MCNC: NORMAL MG/DL
COLOR UR AUTO: YELLOW
GLUCOSE UR STRIP.AUTO-MCNC: NORMAL MG/DL
KETONES UR STRIP.AUTO-MCNC: NORMAL MG/DL
LEUKOCYTE ESTERASE UR QL STRIP.AUTO: NORMAL
NITRITE UR QL STRIP.AUTO: NORMAL
PH UR STRIP.AUTO: 7 [PH] (ref 5–8)
PROT UR QL STRIP: NORMAL MG/DL
RBC UR QL AUTO: NORMAL
SP GR UR STRIP.AUTO: 1.01
UROBILINOGEN UR STRIP-MCNC: NORMAL MG/DL

## 2019-03-29 PROCEDURE — 87491 CHLMYD TRACH DNA AMP PROBE: CPT

## 2019-03-29 PROCEDURE — 81002 URINALYSIS NONAUTO W/O SCOPE: CPT | Performed by: FAMILY MEDICINE

## 2019-03-29 PROCEDURE — 87591 N.GONORRHOEAE DNA AMP PROB: CPT

## 2019-03-29 PROCEDURE — 99214 OFFICE O/P EST MOD 30 MIN: CPT | Performed by: FAMILY MEDICINE

## 2019-03-29 RX ORDER — HYDROCORTISONE ACETATE 25 MG/1
25 SUPPOSITORY RECTAL EVERY 12 HOURS
Qty: 10 SUPPOSITORY | Refills: 0 | Status: SHIPPED | OUTPATIENT
Start: 2019-03-29 | End: 2019-06-14

## 2019-03-29 RX ORDER — VALSARTAN AND HYDROCHLOROTHIAZIDE 160; 25 MG/1; MG/1
1 TABLET ORAL
Qty: 90 TAB | Refills: 1 | Status: SHIPPED | OUTPATIENT
Start: 2019-03-29 | End: 2020-01-03 | Stop reason: SDUPTHER

## 2019-03-29 RX ORDER — TRAZODONE HYDROCHLORIDE 50 MG/1
TABLET ORAL
Qty: 30 TAB | Refills: 2 | Status: SHIPPED | OUTPATIENT
Start: 2019-03-29 | End: 2019-06-14

## 2019-03-29 ASSESSMENT — PATIENT HEALTH QUESTIONNAIRE - PHQ9: CLINICAL INTERPRETATION OF PHQ2 SCORE: 0

## 2019-03-29 NOTE — ASSESSMENT & PLAN NOTE
Having recurrent anal discomfort, some bleeding after BM's since last October. Has had hemorrhoids since delivery of her daughter 20 years ago.

## 2019-03-29 NOTE — ASSESSMENT & PLAN NOTE
Has been having some burning with urination. Had dip done at [facility], cx negative. Would like to be retested, tested for STD as well.

## 2019-03-29 NOTE — PROGRESS NOTES
Complaint: Hemorrhoids     Subjective:     Louisa Nieto is a 54 y.o. female here today with a couple new ailments.    Other hemorrhoids  Having recurrent anal discomfort, some bleeding and greenish d/c after BM's since last October. Has had hemorrhoids since delivery of her daughter 20 years ago.    Dysuria  Has been having some burning with urination. Had dip done at [facility], cx negative. Would like to be retested, tested for STD as well.    Insomnia  Restoril helps her get to sleep, but doesn't keep her asleep. Racing thoughts.      Needs refill Hyzaar.    Current medicines (including changes today)  Current Outpatient Prescriptions   Medication Sig Dispense Refill   • hydrocortisone (ANUSOL-HC) 25 MG Suppos Insert 1 Suppository in rectum every 12 hours. 10 Suppository 0   • valsartan-hydrochlorothiazide (DIOVAN-HCT) 160-25 MG per tablet Take 1 Tab by mouth every day. 90 Tab 1   • traZODone (DESYREL) 50 MG Tab Take with temazepam for sleep 30 Tab 2   • [START ON 4/13/2019] temazepam (RESTORIL) 15 MG Cap Take 1 Cap by mouth at bedtime as needed for Sleep for up to 30 doses. 30 Cap 0   • ADVAIR DISKUS 500-50 MCG/DOSE AEROSOL POWDER, BREATH ACTIVATED INHALE 1 PUFF BY MOUTH TWICE DAILY 60 Inhaler 0   • cetirizine (ZYRTEC) 10 MG TABS TAKE 1 TABLET BY MOUTH EVERY DAY 30 Tab 11   • albuterol (PROVENTIL) 2.5mg/3ml NEBU solution for nebulization 3 mL by Nebulization route every four hours as needed for Shortness of Breath. 150 mL 3   • albuterol (VENTOLIN OR PROVENTIL) 108 (90 BASE) MCG/ACT Aero Soln inhalation aerosol Inhale 2 Puffs by mouth every 6 hours as needed for Shortness of Breath. (Patient not taking: Reported on 3/29/2019) 2 Inhaler 3     No current facility-administered medications for this visit.      She  has a past medical history of Asthma in adult (10/2/2013); Chronic sinusitis; Gout; HTN (hypertension) (10/2/2013); Hypertension; and URI (upper respiratory infection) (10/2/2013).    Health  Maintenance:       Allergies: Aspirin    Current Outpatient Prescriptions Ordered in [facility]   Medication Sig Dispense Refill   • hydrocortisone (ANUSOL-HC) 25 MG Suppos Insert 1 Suppository in rectum every 12 hours. 10 Suppository 0   • valsartan-hydrochlorothiazide (DIOVAN-HCT) 160-25 MG per tablet Take 1 Tab by mouth every day. 90 Tab 1   • traZODone (DESYREL) 50 MG Tab Take with temazepam for sleep 30 Tab 2   • [START ON 4/13/2019] temazepam (RESTORIL) 15 MG Cap Take 1 Cap by mouth at bedtime as needed for Sleep for up to 30 doses. 30 Cap 0   • ADVAIR DISKUS 500-50 MCG/DOSE AEROSOL POWDER, BREATH ACTIVATED INHALE 1 PUFF BY MOUTH TWICE DAILY 60 Inhaler 0   • cetirizine (ZYRTEC) 10 MG TABS TAKE 1 TABLET BY MOUTH EVERY DAY 30 Tab 11   • albuterol (PROVENTIL) 2.5mg/3ml NEBU solution for nebulization 3 mL by Nebulization route every four hours as needed for Shortness of Breath. 150 mL 3   • albuterol (VENTOLIN OR PROVENTIL) 108 (90 BASE) MCG/ACT Aero Soln inhalation aerosol Inhale 2 Puffs by mouth every 6 hours as needed for Shortness of Breath. (Patient not taking: Reported on 3/29/2019) 2 Inhaler 3     No current Epic-ordered facility-administered medications on file.        Past Medical History:   Diagnosis Date   • Asthma in adult 10/2/2013   • Chronic sinusitis    • Gout    • HTN (hypertension) 10/2/2013   • Hypertension    • URI (upper respiratory infection) 10/2/2013    X 2 weeks Thick nasal congestion Can't breathe       Past Surgical History:   Procedure Laterality Date   • APPENDECTOMY     • PRIMARY C SECTION      x1       Social History   Substance Use Topics   • Smoking status: Never Smoker   • Smokeless tobacco: Current User   • Alcohol use No       Social History     Social History Narrative   • No narrative on file       Family History   Problem Relation Age of Onset   • Heart Disease Father    • Cancer Father         renal   • Heart Disease Paternal Grandfather          ROS   Patient denies any fever,  "chills, unintentional weight gain/loss, fatigue, stroke symptoms, dizziness, headache, nasal congestion, sore-throat, cough, heartburn, chest pain, difficulty breathing, abdominal discomfort, diarrhea/constipation, burning with urination or frequency, joint or back pain, skin rashes, depression or anxiety.       Objective:     Blood pressure 122/78, pulse (!) 108, temperature 36.3 °C (97.3 °F), temperature source Temporal, resp. rate 14, height 1.702 m (5' 7\"), weight 69.4 kg (153 lb), SpO2 94 %. Body mass index is 23.96 kg/m².   Physical Exam:  Constitutional: Alert, no distress. Anxious.  : perineum intact. External hemorrhoid tags.       Assessment and Plan:   The following treatment plan was discussed    1. Other hemorrhoids  Trial of therapy. If symptoms persist, will refer to Renee Jackson and Alec in [place] .  - hydrocortisone (ANUSOL-HC) 25 MG Suppos; Insert 1 Suppository in rectum every 12 hours.  Dispense: 10 Suppository; Refill: 0    2. Dysuria  Might be overactive bladder. Will notify with results.  - Chlamydia/GC PCR Urine Or Swab; Future  - POCT Urinalysis    3. Primary insomnia  Add trazodone to temazepam.  - traZODone (DESYREL) 50 MG Tab; Take with temazepam for sleep  Dispense: 30 Tab; Refill: 2    4. Essential hypertension  Controlled.  - valsartan-hydrochlorothiazide (DIOVAN-HCT) 160-25 MG per tablet; Take 1 Tab by mouth every day.  Dispense: 90 Tab; Refill: 1      Followup: Return if symptoms worsen or fail to improve.    Please note that this dictation was created using voice recognition software. I have made every reasonable attempt to correct obvious errors, but I expect that there are errors of grammar and possibly content that I did not discover before finalizing the note.           "

## 2019-03-30 LAB
AMBIGUOUS DTTM AMBI4: NORMAL
SIGNIFICANT IND 70042: NORMAL
SITE SITE: NORMAL
SOURCE SOURCE: NORMAL

## 2019-03-31 LAB
C TRACH DNA SPEC QL NAA+PROBE: NEGATIVE
N GONORRHOEA DNA SPEC QL NAA+PROBE: NEGATIVE
SPECIMEN SOURCE: NORMAL

## 2019-05-01 DIAGNOSIS — F51.01 PRIMARY INSOMNIA: ICD-10-CM

## 2019-05-01 RX ORDER — TEMAZEPAM 15 MG/1
15 CAPSULE ORAL NIGHTLY PRN
Qty: 30 CAP | Refills: 0 | OUTPATIENT
Start: 2019-05-01 | End: 2019-05-31

## 2019-05-01 NOTE — TELEPHONE ENCOUNTER
Was the patient seen in the last year in this department? Yes 3/29/19    Does patient have an active prescription for medications requested? Yes    Received Request Via: Pharmacy         Labs in media

## 2019-06-07 DIAGNOSIS — F51.01 PRIMARY INSOMNIA: ICD-10-CM

## 2019-06-14 ENCOUNTER — OFFICE VISIT (OUTPATIENT)
Dept: MEDICAL GROUP | Facility: CLINIC | Age: 54
End: 2019-06-14
Payer: COMMERCIAL

## 2019-06-14 VITALS
TEMPERATURE: 97.9 F | HEART RATE: 87 BPM | DIASTOLIC BLOOD PRESSURE: 84 MMHG | HEIGHT: 67 IN | OXYGEN SATURATION: 95 % | SYSTOLIC BLOOD PRESSURE: 126 MMHG | WEIGHT: 157 LBS | RESPIRATION RATE: 16 BRPM | BODY MASS INDEX: 24.64 KG/M2

## 2019-06-14 DIAGNOSIS — F51.01 PRIMARY INSOMNIA: ICD-10-CM

## 2019-06-14 DIAGNOSIS — K60.4 RECTAL FISTULA: ICD-10-CM

## 2019-06-14 PROCEDURE — 99213 OFFICE O/P EST LOW 20 MIN: CPT | Performed by: FAMILY MEDICINE

## 2019-06-14 RX ORDER — TEMAZEPAM 15 MG/1
15 CAPSULE ORAL NIGHTLY PRN
COMMUNITY
End: 2019-06-14 | Stop reason: SDUPTHER

## 2019-06-14 RX ORDER — TEMAZEPAM 15 MG/1
15 CAPSULE ORAL NIGHTLY PRN
Qty: 30 CAP | Refills: 2 | Status: SHIPPED | OUTPATIENT
Start: 2019-06-14 | End: 2019-07-14

## 2019-06-14 NOTE — ASSESSMENT & PLAN NOTE
Saw Dr. Michael in [place], found to have small fistula. Will be undergoing colonoscopy in near future. Does not think fistula needs surgical correction.

## 2019-06-14 NOTE — PROGRESS NOTES
Complaint: Refill Restoril     Subjective:     Louisa Nieto is a 54 y.o. female here today for    Rectal fistula  Saw Dr. Michael in [place], found to have small fistula. Will be undergoing colonoscopy in near future. Does not think fistula needs surgical correction.    Insomnia  Did not continue on Trazodone, made her too groggy, needs refill Restoril.     No other concerns or complaints today    Current medicines (including changes today)  Current Outpatient Prescriptions   Medication Sig Dispense Refill   • temazepam (RESTORIL) 15 MG Cap Take 1 Cap by mouth at bedtime as needed for Sleep for up to 30 days. 30 Cap 2   • valsartan-hydrochlorothiazide (DIOVAN-HCT) 160-25 MG per tablet Take 1 Tab by mouth every day. 90 Tab 1   • ADVAIR DISKUS 500-50 MCG/DOSE AEROSOL POWDER, BREATH ACTIVATED INHALE 1 PUFF BY MOUTH TWICE DAILY 60 Inhaler 0   • albuterol (VENTOLIN OR PROVENTIL) 108 (90 BASE) MCG/ACT Aero Soln inhalation aerosol Inhale 2 Puffs by mouth every 6 hours as needed for Shortness of Breath. 2 Inhaler 3   • albuterol (PROVENTIL) 2.5mg/3ml NEBU solution for nebulization 3 mL by Nebulization route every four hours as needed for Shortness of Breath. 150 mL 3     No current facility-administered medications for this visit.      She  has a past medical history of Asthma in adult (10/2/2013); Chronic sinusitis; Gout; HTN (hypertension) (10/2/2013); Hypertension; and URI (upper respiratory infection) (10/2/2013).    Health Maintenance:      Allergies: Aspirin    Current Outpatient Prescriptions Ordered in [facility]   Medication Sig Dispense Refill   • temazepam (RESTORIL) 15 MG Cap Take 1 Cap by mouth at bedtime as needed for Sleep for up to 30 days. 30 Cap 2   • valsartan-hydrochlorothiazide (DIOVAN-HCT) 160-25 MG per tablet Take 1 Tab by mouth every day. 90 Tab 1   • ADVAIR DISKUS 500-50 MCG/DOSE AEROSOL POWDER, BREATH ACTIVATED INHALE 1 PUFF BY MOUTH TWICE DAILY 60 Inhaler 0   • albuterol (VENTOLIN OR PROVENTIL)  "108 (90 BASE) MCG/ACT Aero Soln inhalation aerosol Inhale 2 Puffs by mouth every 6 hours as needed for Shortness of Breath. 2 Inhaler 3   • albuterol (PROVENTIL) 2.5mg/3ml NEBU solution for nebulization 3 mL by Nebulization route every four hours as needed for Shortness of Breath. 150 mL 3     No current Epic-ordered facility-administered medications on file.        Past Medical History:   Diagnosis Date   • Asthma in adult 10/2/2013   • Chronic sinusitis    • Gout    • HTN (hypertension) 10/2/2013   • Hypertension    • URI (upper respiratory infection) 10/2/2013    X 2 weeks Thick nasal congestion Can't breathe       Past Surgical History:   Procedure Laterality Date   • APPENDECTOMY     • PRIMARY C SECTION      x1       Social History   Substance Use Topics   • Smoking status: Never Smoker   • Smokeless tobacco: Current User   • Alcohol use No       Social History     Social History Narrative   • No narrative on file       Family History   Problem Relation Age of Onset   • Heart Disease Father    • Cancer Father         renal   • Heart Disease Paternal Grandfather          ROS   Patient denies any fever, chills, unintentional weight gain/loss, fatigue, stroke symptoms, dizziness, headache, nasal congestion, sore-throat, cough, heartburn, chest pain, difficulty breathing, abdominal discomfort, diarrhea/constipation, burning with urination or frequency, joint or back pain, skin rashes, depression or anxiety.       Objective:     /84 (BP Location: Left arm, Patient Position: Sitting, BP Cuff Size: Adult)   Pulse 87   Temp 36.6 °C (97.9 °F) (Temporal)   Resp 16   Ht 1.689 m (5' 6.5\")   Wt 71.2 kg (157 lb)   SpO2 95%  Body mass index is 24.96 kg/m².   Physical Exam:  Constitutional: Alert, no distress.  Psych: Alert and oriented x3, appropriate affect and mood.        Assessment and Plan:   The following treatment plan was discussed    1. Primary insomnia  Taxceusebio queried. Due.  - temazepam (RESTORIL) 15 " MG Cap; Take 1 Cap by mouth at bedtime as needed for Sleep for up to 30 days.  Dispense: 30 Cap; Refill: 2        Followup: Return in about 3 months (around 9/14/2019), or if symptoms worsen or fail to improve.    Please note that this dictation was created using voice recognition software. I have made every reasonable attempt to correct obvious errors, but I expect that there are errors of grammar and possibly content that I did not discover before finalizing the note.

## 2019-09-27 ENCOUNTER — OFFICE VISIT (OUTPATIENT)
Dept: MEDICAL GROUP | Facility: CLINIC | Age: 54
End: 2019-09-27
Payer: COMMERCIAL

## 2019-09-27 VITALS
WEIGHT: 158 LBS | SYSTOLIC BLOOD PRESSURE: 128 MMHG | RESPIRATION RATE: 14 BRPM | OXYGEN SATURATION: 99 % | TEMPERATURE: 97.2 F | DIASTOLIC BLOOD PRESSURE: 76 MMHG | HEIGHT: 67 IN | BODY MASS INDEX: 24.8 KG/M2 | HEART RATE: 81 BPM

## 2019-09-27 DIAGNOSIS — I10 ESSENTIAL HYPERTENSION: ICD-10-CM

## 2019-09-27 DIAGNOSIS — F51.01 PRIMARY INSOMNIA: ICD-10-CM

## 2019-09-27 PROBLEM — R30.0 DYSURIA: Status: RESOLVED | Noted: 2019-03-29 | Resolved: 2019-09-27

## 2019-09-27 PROCEDURE — 99213 OFFICE O/P EST LOW 20 MIN: CPT | Performed by: FAMILY MEDICINE

## 2019-09-27 RX ORDER — TEMAZEPAM 15 MG/1
15 CAPSULE ORAL NIGHTLY PRN
Qty: 90 CAP | Refills: 0 | Status: SHIPPED | OUTPATIENT
Start: 2019-09-27 | End: 2019-12-26

## 2019-09-27 RX ORDER — TEMAZEPAM 15 MG/1
15 CAPSULE ORAL NIGHTLY PRN
COMMUNITY
End: 2019-09-27 | Stop reason: SDUPTHER

## 2019-09-27 NOTE — ASSESSMENT & PLAN NOTE
Sleeping well on temazepam, needs refill. Will probably need less frequently due to change to 7 on/7 off schedule.

## 2019-09-27 NOTE — PROGRESS NOTES
Complaint: Refill temazepam.     Subjective:     Louisa Nieto is a 54 y.o. female here today accompanied by her mother. Doing well. In serious relationship x 7 months.    Insomnia  Sleeping well on temazepam, needs refill. Will probably need less frequently due to change to 7 on/7 off schedule.    HTN (hypertension)  Controlled on Diovan-HCT.     Occasional bleeding after hard BM.    Current medicines (including changes today)  Current Outpatient Medications   Medication Sig Dispense Refill   • temazepam (RESTORIL) 15 MG Cap Take 1 Cap by mouth at bedtime as needed for Sleep for up to 90 days. 90 Cap 0   • valsartan-hydrochlorothiazide (DIOVAN-HCT) 160-25 MG per tablet Take 1 Tab by mouth every day. 90 Tab 1   • ADVAIR DISKUS 500-50 MCG/DOSE AEROSOL POWDER, BREATH ACTIVATED INHALE 1 PUFF BY MOUTH TWICE DAILY 60 Inhaler 0   • albuterol (VENTOLIN OR PROVENTIL) 108 (90 BASE) MCG/ACT Aero Soln inhalation aerosol Inhale 2 Puffs by mouth every 6 hours as needed for Shortness of Breath. 2 Inhaler 3   • albuterol (PROVENTIL) 2.5mg/3ml NEBU solution for nebulization 3 mL by Nebulization route every four hours as needed for Shortness of Breath. 150 mL 3     No current facility-administered medications for this visit.      She  has a past medical history of Asthma in adult (10/2/2013), Chronic sinusitis, Gout, HTN (hypertension) (10/2/2013), Hypertension, and URI (upper respiratory infection) (10/2/2013).    Health Maintenance: will address at f/u      Allergies: Aspirin    Current Outpatient Medications Ordered in Epic   Medication Sig Dispense Refill   • temazepam (RESTORIL) 15 MG Cap Take 1 Cap by mouth at bedtime as needed for Sleep for up to 90 days. 90 Cap 0   • valsartan-hydrochlorothiazide (DIOVAN-HCT) 160-25 MG per tablet Take 1 Tab by mouth every day. 90 Tab 1   • ADVAIR DISKUS 500-50 MCG/DOSE AEROSOL POWDER, BREATH ACTIVATED INHALE 1 PUFF BY MOUTH TWICE DAILY 60 Inhaler 0   • albuterol (VENTOLIN OR  "PROVENTIL) 108 (90 BASE) MCG/ACT Aero Soln inhalation aerosol Inhale 2 Puffs by mouth every 6 hours as needed for Shortness of Breath. 2 Inhaler 3   • albuterol (PROVENTIL) 2.5mg/3ml NEBU solution for nebulization 3 mL by Nebulization route every four hours as needed for Shortness of Breath. 150 mL 3     No current [facility]-ordered facility-administered medications on file.        Past Medical History:   Diagnosis Date   • Asthma in adult 10/2/2013   • Chronic sinusitis    • Gout    • HTN (hypertension) 10/2/2013   • Hypertension    • URI (upper respiratory infection) 10/2/2013    X 2 weeks Thick nasal congestion Can't breathe       Past Surgical History:   Procedure Laterality Date   • APPENDECTOMY     • PRIMARY C SECTION      x1       Social History     Tobacco Use   • Smoking status: Never Smoker   • Smokeless tobacco: Current User   Substance Use Topics   • Alcohol use: No   • Drug use: No       Social History     Social History Narrative   • Not on file       Family History   Problem Relation Age of Onset   • Heart Disease Father    • Cancer Father         renal   • Heart Disease Paternal Grandfather          ROS   Patient denies any fever, chills, unintentional weight gain/loss, fatigue, stroke symptoms, dizziness, headache, nasal congestion, sore-throat, cough, heartburn, chest pain, difficulty breathing, abdominal discomfort, diarrhea/constipation, burning with urination or frequency, joint or back pain, skin rashes, depression or anxiety.       Objective:     /76   Pulse 81   Temp 36.2 °C (97.2 °F) (Temporal)   Resp 14   Ht 1.702 m (5' 7\")   Wt 71.7 kg (158 lb)   SpO2 99%  Body mass index is 24.75 kg/m².   Physical Exam:  Constitutional: Alert, no distress.  No formal exam      Assessment and Plan:   The following treatment plan was discussed  Nilo queried, last refill 8/20.    1. Primary insomnia  Controlled on med.  - temazepam (RESTORIL) 15 MG Cap; Take 1 Cap by mouth at bedtime as needed " for Sleep for up to 90 days.  Dispense: 90 Cap; Refill: 0    2. Essential hypertension  Controlled on med.      Followup: No follow-ups on file.    Please note that this dictation was created using voice recognition software. I have made every reasonable attempt to correct obvious errors, but I expect that there are errors of grammar and possibly content that I did not discover before finalizing the note.

## 2020-01-03 ENCOUNTER — OFFICE VISIT (OUTPATIENT)
Dept: MEDICAL GROUP | Facility: CLINIC | Age: 55
End: 2020-01-03
Payer: COMMERCIAL

## 2020-01-03 VITALS
DIASTOLIC BLOOD PRESSURE: 78 MMHG | RESPIRATION RATE: 14 BRPM | BODY MASS INDEX: 25.96 KG/M2 | HEIGHT: 67 IN | SYSTOLIC BLOOD PRESSURE: 126 MMHG | WEIGHT: 165.4 LBS | TEMPERATURE: 97.9 F | HEART RATE: 84 BPM

## 2020-01-03 DIAGNOSIS — Z12.11 SCREENING FOR COLON CANCER: ICD-10-CM

## 2020-01-03 DIAGNOSIS — J31.0 CHRONIC RHINITIS: ICD-10-CM

## 2020-01-03 DIAGNOSIS — I10 ESSENTIAL HYPERTENSION: ICD-10-CM

## 2020-01-03 DIAGNOSIS — J38.1 VOCAL CORD POLYPS: ICD-10-CM

## 2020-01-03 DIAGNOSIS — F51.01 PRIMARY INSOMNIA: ICD-10-CM

## 2020-01-03 DIAGNOSIS — J33.9 NASAL POLYPS: ICD-10-CM

## 2020-01-03 PROBLEM — K60.4 RECTAL FISTULA: Status: RESOLVED | Noted: 2019-06-14 | Resolved: 2020-01-03

## 2020-01-03 PROCEDURE — 99214 OFFICE O/P EST MOD 30 MIN: CPT | Performed by: FAMILY MEDICINE

## 2020-01-03 RX ORDER — TEMAZEPAM 15 MG/1
15 CAPSULE ORAL NIGHTLY PRN
Qty: 90 CAP | Refills: 0 | Status: SHIPPED | OUTPATIENT
Start: 2020-01-03 | End: 2020-04-02

## 2020-01-03 RX ORDER — VALSARTAN AND HYDROCHLOROTHIAZIDE 160; 25 MG/1; MG/1
1 TABLET ORAL
Qty: 90 TAB | Refills: 1 | Status: SHIPPED | OUTPATIENT
Start: 2020-01-03 | End: 2020-06-29 | Stop reason: SDUPTHER

## 2020-01-04 NOTE — ASSESSMENT & PLAN NOTE
Ongoing issue. Followed by Dr. Ronquillo. Had surgery, but voice continues to be raspy. Thought to be caused by PND.Kenalog shots help. Never seen by allergist.

## 2020-01-04 NOTE — PROGRESS NOTES
Complaint: Multiple issues     Subjective:     Louisa Nieto is a 54 y.o. female here today accompanied by her mother.    Vocal cord polyps  Ongoing issue. Followed by Dr. Ronquillo. Had surgery, but voice continues to be raspy. Thought to be caused by PND.Kenalog shots help. Never seen by allergist. Afraid to go off her allergy meds for testing, due to possible bad asthma attack.    Insomnia  Needs refill of her temazepam. [facility] according to [facility] was 9/27.    HTN (hypertension)  Managed with [facility]. Needs refill.     Doing well at work, stable relationship, buying house. [place].    Current medicines (including changes today)  Current Outpatient Medications   Medication Sig Dispense Refill   • temazepam (RESTORIL) 15 MG Cap Take 1 Cap by mouth at bedtime as needed for Sleep for up to 90 days. 90 Cap 0   • valsartan-hydrochlorothiazide (DIOVAN-HCT) 160-25 MG per tablet Take 1 Tab by mouth every day. 90 Tab 1   • ADVAIR DISKUS 500-50 MCG/DOSE AEROSOL POWDER, BREATH ACTIVATED INHALE 1 PUFF BY MOUTH TWICE DAILY 60 Inhaler 0   • albuterol (VENTOLIN OR PROVENTIL) 108 (90 BASE) MCG/ACT Aero Soln inhalation aerosol Inhale 2 Puffs by mouth every 6 hours as needed for Shortness of Breath. 2 Inhaler 3   • albuterol (PROVENTIL) 2.5mg/3ml NEBU solution for nebulization 3 mL by Nebulization route every four hours as needed for Shortness of Breath. 150 mL 3     No current facility-administered medications for this visit.      She  has a past medical history of Asthma in adult (10/2/2013), Chronic sinusitis, Gout, HTN (hypertension) (10/2/2013), Hypertension, and URI (upper respiratory infection) (10/2/2013).    Health Maintenance:       Allergies: Aspirin and Nsaids    Current Outpatient Medications Ordered in Epic   Medication Sig Dispense Refill   • temazepam (RESTORIL) 15 MG Cap Take 1 Cap by mouth at bedtime as needed for Sleep for up to 90 days. 90 Cap 0   • valsartan-hydrochlorothiazide  "(DIOVAN-HCT) 160-25 MG per tablet Take 1 Tab by mouth every day. 90 Tab 1   • ADVAIR DISKUS 500-50 MCG/DOSE AEROSOL POWDER, BREATH ACTIVATED INHALE 1 PUFF BY MOUTH TWICE DAILY 60 Inhaler 0   • albuterol (VENTOLIN OR PROVENTIL) 108 (90 BASE) MCG/ACT Aero Soln inhalation aerosol Inhale 2 Puffs by mouth every 6 hours as needed for Shortness of Breath. 2 Inhaler 3   • albuterol (PROVENTIL) 2.5mg/3ml NEBU solution for nebulization 3 mL by Nebulization route every four hours as needed for Shortness of Breath. 150 mL 3     No current [facility]-ordered facility-administered medications on file.        Past Medical History:   Diagnosis Date   • Asthma in adult 10/2/2013   • Chronic sinusitis    • Gout    • HTN (hypertension) 10/2/2013   • Hypertension    • URI (upper respiratory infection) 10/2/2013    X 2 weeks Thick nasal congestion Can't breathe       Past Surgical History:   Procedure Laterality Date   • APPENDECTOMY     • PRIMARY C SECTION      x1       Social History     Tobacco Use   • Smoking status: Never Smoker   • Smokeless tobacco: Current User   Substance Use Topics   • Alcohol use: No   • Drug use: No       Social History     Patient does not qualify to have social determinant information on file (likely too young).   Social History Narrative   • Not on file       Family History   Problem Relation Age of Onset   • Heart Disease Father    • Cancer Father         renal   • Heart Disease Paternal Grandfather          ROS Positive for raspy voice, nasal congestion, postnasal drip, trouble sleeping  Patient denies any fever, chills, unintentional weight gain/loss, fatigue, stroke symptoms, dizziness, headache, sore-throat, cough, heartburn, chest pain, difficulty breathing, abdominal discomfort, diarrhea/constipation, burning with urination or frequency, joint or back pain, skin rashes, depression or anxiety.       Objective:     /78   Pulse 84   Temp 36.6 °C (97.9 °F)   Resp 14   Ht 1.702 m (5' 7\")   Wt 75 kg " (165 lb 6.4 oz)  Body mass index is 25.91 kg/m².   Physical Exam:  Constitutional: Alert, no distress. Raspy voice.  Psych: Alert and oriented x3, appropriate affect and mood.        Assessment and Plan:   The following treatment plan was discussed    1. Primary insomnia  Controlled on meds.  - temazepam (RESTORIL) 15 MG Cap; Take 1 Cap by mouth at bedtime as needed for Sleep for up to 90 days.  Dispense: 90 Cap; Refill: 0    2. Vocal cord polyps/Nasal polyps/Chronic rhinitis  Chronic problems.  - REFERRAL TO ALLERGY - Dr. Sim.      3. Essential hypertension  Controlled on meds.  - valsartan-hydrochlorothiazide (DIOVAN-HCT) 160-25 MG per tablet; Take 1 Tab by mouth every day.  Dispense: 90 Tab; Refill: 1    4. Screening for colon cancer  Will notify with result.  - OCCULT BLOOD FECES IMMUNOASSAY; Future      Followup: Return in about 3 months (around 4/3/2020).    Please note that this dictation was created using voice recognition software. I have made every reasonable attempt to correct obvious errors, but I expect that there are errors of grammar and possibly content that I did not discover before finalizing the note.

## 2020-01-11 ENCOUNTER — OFFICE VISIT (OUTPATIENT)
Dept: URGENT CARE | Facility: PHYSICIAN GROUP | Age: 55
End: 2020-01-11
Payer: COMMERCIAL

## 2020-01-11 VITALS
WEIGHT: 165 LBS | HEART RATE: 136 BPM | TEMPERATURE: 97.9 F | RESPIRATION RATE: 18 BRPM | BODY MASS INDEX: 25.84 KG/M2 | OXYGEN SATURATION: 98 % | DIASTOLIC BLOOD PRESSURE: 70 MMHG | SYSTOLIC BLOOD PRESSURE: 128 MMHG

## 2020-01-11 DIAGNOSIS — J02.9 PHARYNGITIS, UNSPECIFIED ETIOLOGY: Primary | ICD-10-CM

## 2020-01-11 PROCEDURE — 99213 OFFICE O/P EST LOW 20 MIN: CPT | Mod: 25 | Performed by: FAMILY MEDICINE

## 2020-01-11 RX ORDER — AMOXICILLIN 500 MG/1
500 CAPSULE ORAL 2 TIMES DAILY
Qty: 10 CAP | Refills: 0 | Status: SHIPPED | OUTPATIENT
Start: 2020-01-11 | End: 2020-04-17

## 2020-01-11 RX ORDER — LORATADINE 10 MG/1
10 CAPSULE, LIQUID FILLED ORAL DAILY
Qty: 30 CAP | Refills: 0 | Status: SHIPPED | OUTPATIENT
Start: 2020-01-11 | End: 2020-04-17

## 2020-01-11 RX ORDER — DEXAMETHASONE SODIUM PHOSPHATE 10 MG/ML
10 INJECTION INTRAMUSCULAR; INTRAVENOUS ONCE
Status: COMPLETED | OUTPATIENT
Start: 2020-01-11 | End: 2020-01-11

## 2020-01-11 RX ORDER — FLUTICASONE PROPIONATE 50 MCG
1 SPRAY, SUSPENSION (ML) NASAL DAILY
Qty: 16 G | Refills: 0 | Status: SHIPPED | OUTPATIENT
Start: 2020-01-11 | End: 2020-04-17

## 2020-01-11 RX ADMIN — DEXAMETHASONE SODIUM PHOSPHATE 10 MG: 10 INJECTION INTRAMUSCULAR; INTRAVENOUS at 09:20

## 2020-01-11 ASSESSMENT — ENCOUNTER SYMPTOMS
ABDOMINAL PAIN: 0
SWOLLEN GLANDS: 0
STRIDOR: 0
COUGH: 0
TROUBLE SWALLOWING: 0
HOARSE VOICE: 0
VOMITING: 0
NECK PAIN: 0
HEADACHES: 0
SHORTNESS OF BREATH: 0
DIARRHEA: 0

## 2020-01-11 NOTE — PROGRESS NOTES
Subjective:      Hortencia Nieto is a 54 y.o. female who presents with Sinus Problem (was seen by her ENT for a sinus infection was given a zpack and predisone, was unable to get her kenalog shot due to her being on predisone and now she is sick) and Cough (productive cough)            Pharyngitis    This is a new problem. The current episode started in the past 7 days. The problem has been unchanged. There has been no fever. The patient is experiencing no pain. Associated symptoms include congestion and a plugged ear sensation. Pertinent negatives include no abdominal pain, coughing, diarrhea, drooling, ear discharge, ear pain, headaches, hoarse voice, neck pain, shortness of breath, stridor, swollen glands, trouble swallowing or vomiting. She has had no exposure to strep or mono. She has tried nothing for the symptoms. The treatment provided no relief.       Review of Systems   HENT: Positive for congestion. Negative for drooling, ear discharge, ear pain, hoarse voice and trouble swallowing.    Respiratory: Negative for cough, shortness of breath and stridor.    Gastrointestinal: Negative for abdominal pain, diarrhea and vomiting.   Musculoskeletal: Negative for neck pain.   Neurological: Negative for headaches.   All other systems reviewed and are negative.         Objective:     /70   Pulse (!) 136   Temp 36.6 °C (97.9 °F)   Resp 18   Wt 74.8 kg (165 lb)   SpO2 98%   BMI 25.84 kg/m²      Physical Exam  Constitutional:       General: She is not in acute distress.     Appearance: She is not ill-appearing, toxic-appearing or diaphoretic.   HENT:      Head: Normocephalic.      Right Ear: Tympanic membrane normal.      Left Ear: Tympanic membrane normal.      Nose: Congestion present. No rhinorrhea.      Mouth/Throat:      Pharynx: Posterior oropharyngeal erythema present. No oropharyngeal exudate.   Eyes:      Extraocular Movements: Extraocular movements intact.   Neck:      Musculoskeletal: Normal  range of motion.   Cardiovascular:      Rate and Rhythm: Normal rate.   Pulmonary:      Effort: Pulmonary effort is normal.   Musculoskeletal: Normal range of motion.   Skin:     General: Skin is warm.   Neurological:      General: No focal deficit present.      Mental Status: She is alert.   Psychiatric:         Mood and Affect: Mood normal.                 Assessment/Plan:       1. Pharyngitis, unspecified etiology  1. Pharyngitis, unspecified etiology  dexamethasone (DECADRON) injection (check route below) 10 mg    fluticasone (FLONASE ALLERGY RELIEF) 50 MCG/ACT nasal spray    Loratadine (CLARITIN) 10 MG Cap    amoxicillin (AMOXIL) 500 MG Cap       - dexamethasone (DECADRON) injection (check route below) 10 mg  - fluticasone (FLONASE ALLERGY RELIEF) 50 MCG/ACT nasal spray; Spray 1 Spray in nose every day.  Dispense: 16 g; Refill: 0  - Loratadine (CLARITIN) 10 MG Cap; Take 10 mg by mouth every day.  Dispense: 30 Cap; Refill: 0  - amoxicillin (AMOXIL) 500 MG Cap; Take 1 Cap by mouth 2 times a day.  Dispense: 10 Cap; Refill: 0    -Signs and symptoms most consistent with allergic rhinitis versus acute rhinosinusitis, patient has an appoint to see the ENT on Monday, however she has severe congestion, will give the patient Decadron to last her until Monday, and also Flonase and Claritin for the congestion, in the meanwhile since patient has severe sore throat with some patches will have the patient cover for pharyngitis, patient was also consulted to do salt and water, tea and honey and come to the clinic if anything changed or if she has any other concerns

## 2020-01-11 NOTE — PATIENT INSTRUCTIONS
Pharyngitis  Pharyngitis is a sore throat (pharynx). There is redness, pain, and swelling of your throat.  Follow these instructions at home:  · Drink enough fluids to keep your pee (urine) clear or pale yellow.  · Only take medicine as told by your doctor.  ¨ You may get sick again if you do not take medicine as told. Finish your medicines, even if you start to feel better.  ¨ Do not take aspirin.  · Rest.  · Rinse your mouth (gargle) with salt water (½ tsp of salt per 1 qt of water) every 1-2 hours. This will help the pain.  · If you are not at risk for choking, you can suck on hard candy or sore throat lozenges.  Contact a doctor if:  · You have large, tender lumps on your neck.  · You have a rash.  · You cough up green, yellow-brown, or bloody spit.  Get help right away if:  · You have a stiff neck.  · You drool or cannot swallow liquids.  · You throw up (vomit) or are not able to keep medicine or liquids down.  · You have very bad pain that does not go away with medicine.  · You have problems breathing (not from a stuffy nose).  This information is not intended to replace advice given to you by your health care provider. Make sure you discuss any questions you have with your health care provider.  Document Released: 06/05/2009 Document Revised: 05/25/2017 Document Reviewed: 08/25/2014  [employer] Interactive Patient Education © 2017 [employer]

## 2020-02-24 ENCOUNTER — TELEMEDICINE2 (OUTPATIENT)
Dept: URGENT CARE | Facility: CLINIC | Age: 55
End: 2020-02-24
Payer: COMMERCIAL

## 2020-02-24 VITALS
HEART RATE: 86 BPM | WEIGHT: 167 LBS | OXYGEN SATURATION: 97 % | SYSTOLIC BLOOD PRESSURE: 148 MMHG | DIASTOLIC BLOOD PRESSURE: 99 MMHG | TEMPERATURE: 98.1 F | HEIGHT: 67 IN | RESPIRATION RATE: 16 BRPM | BODY MASS INDEX: 26.21 KG/M2

## 2020-02-24 DIAGNOSIS — H92.02 ACUTE OTALGIA, LEFT: ICD-10-CM

## 2020-02-24 DIAGNOSIS — H66.90 ACUTE OTITIS MEDIA, UNSPECIFIED OTITIS MEDIA TYPE: ICD-10-CM

## 2020-02-24 PROCEDURE — 99213 OFFICE O/P EST LOW 20 MIN: CPT | Mod: GT,95 | Performed by: FAMILY MEDICINE

## 2020-02-24 RX ORDER — COLISTIN SULFATE, NEOMYCIN SULFATE, THONZONIUM BROMIDE AND HYDROCORTISONE ACETATE 3; 3.3; .5; 1 MG/ML; MG/ML; MG/ML; MG/ML
3 SUSPENSION AURICULAR (OTIC) 3 TIMES DAILY
Qty: 10 ML | Refills: 1 | Status: SHIPPED | OUTPATIENT
Start: 2020-02-24 | End: 2020-03-05

## 2020-02-24 RX ORDER — AMOXICILLIN AND CLAVULANATE POTASSIUM 875; 125 MG/1; MG/1
1 TABLET, FILM COATED ORAL 2 TIMES DAILY
Qty: 20 TAB | Refills: 0 | Status: SHIPPED | OUTPATIENT
Start: 2020-02-24 | End: 2020-03-05

## 2020-02-24 ASSESSMENT — ENCOUNTER SYMPTOMS
COUGH: 0
CHILLS: 0
FEVER: 0

## 2020-02-25 NOTE — PROGRESS NOTES
Subjective:   Louisa Nieto is a 55 y.o. female who presents for Otalgia        55-year-old female presents to urgent care via telemedicine with chief complaint of left ear pain over the past 2 days.  Patient recently has visited higher elevation and return to lower elevation with decreased ability to equalize ears.  The patient has experienced left-sided ear pain which is been refractory to acetaminophen.  Patient had similar ear pain on the right which eventually equalized and resolved.  Patient denies fevers chills or sweats.    The history and and physical examination were completed with assistance of a telemedicine nursing and modalities.  The patient did confirm identity.    Otalgia    Pertinent negatives include no coughing. She has tried acetaminophen for the symptoms. The treatment provided no relief.     PMH:  has a past medical history of Asthma in adult (10/2/2013), Chronic sinusitis, Gout, HTN (hypertension) (10/2/2013), Hypertension, Insomnia, and URI (upper respiratory infection) (10/2/2013).  MEDS:   Current Outpatient Medications:   •  amoxicillin-clavulanate (AUGMENTIN) 875-125 MG Tab, Take 1 Tab by mouth 2 times a day for 10 days., Disp: 20 Tab, Rfl: 0  •  neomycin/colistin/thonz/HC (CORTISPORIN-TC) 3.3-3-10-0.5 MG/ML Suspension, Place 3 Drops in ear 3 times a day for 10 days., Disp: 10 mL, Rfl: 1  •  temazepam (RESTORIL) 15 MG Cap, Take 1 Cap by mouth at bedtime as needed for Sleep for up to 90 days., Disp: 90 Cap, Rfl: 0  •  valsartan-hydrochlorothiazide (DIOVAN-HCT) 160-25 MG per tablet, Take 1 Tab by mouth every day., Disp: 90 Tab, Rfl: 1  •  ADVAIR DISKUS 500-50 MCG/DOSE AEROSOL POWDER, BREATH ACTIVATED, INHALE 1 PUFF BY MOUTH TWICE DAILY, Disp: 60 Inhaler, Rfl: 0  •  albuterol (VENTOLIN OR PROVENTIL) 108 (90 BASE) MCG/ACT Aero Soln inhalation aerosol, Inhale 2 Puffs by mouth every 6 hours as needed for Shortness of Breath., Disp: 2 Inhaler, Rfl: 3  •  albuterol (PROVENTIL)  "2.5mg/3ml NEBU solution for nebulization, 3 mL by Nebulization route every four hours as needed for Shortness of Breath., Disp: 150 mL, Rfl: 3  •  fluticasone (FLONASE ALLERGY RELIEF) 50 MCG/ACT nasal spray, Spray 1 Spray in nose every day. (Patient not taking: Reported on 2/24/2020), Disp: 16 g, Rfl: 0  •  Loratadine (CLARITIN) 10 MG Cap, Take 10 mg by mouth every day. (Patient not taking: Reported on 2/24/2020), Disp: 30 Cap, Rfl: 0  •  amoxicillin (AMOXIL) 500 MG Cap, Take 1 Cap by mouth 2 times a day. (Patient not taking: Reported on 2/24/2020), Disp: 10 Cap, Rfl: 0  ALLERGIES:   Allergies   Allergen Reactions   • Aspirin    • Nsaids      SURGHX:   Past Surgical History:   Procedure Laterality Date   • APPENDECTOMY     • PRIMARY C SECTION      x1     SOCHX:  reports that she has never smoked. She uses smokeless tobacco. She reports that she does not drink alcohol or use drugs.  FH: Family history was reviewed  Review of Systems   Constitutional: Negative for chills and fever.   HENT: Positive for ear pain.    Respiratory: Negative for cough.    All other systems reviewed and are negative.       Objective:   /99 (BP Location: Right arm, Patient Position: Sitting, BP Cuff Size: Adult)   Pulse 86   Temp 36.7 °C (98.1 °F) (Temporal)   Resp 16   Ht 1.702 m (5' 7\")   Wt 75.8 kg (167 lb)   SpO2 97%   BMI 26.16 kg/m²   Physical Exam  Vitals signs and nursing note reviewed.   Constitutional:       General: She is not in acute distress.     Appearance: She is well-developed.   HENT:      Head: Normocephalic and atraumatic.      Right Ear: External ear normal. Swelling present. Tympanic membrane is erythematous and bulging.      Left Ear: External ear normal. Tympanic membrane is not erythematous or bulging.      Mouth/Throat:      Mouth: Mucous membranes are moist.   Eyes:      Pupils: Pupils are equal, round, and reactive to light.   Cardiovascular:      Rate and Rhythm: Normal rate.      Heart sounds: No " murmur.   Pulmonary:      Effort: Pulmonary effort is normal. No respiratory distress.   Abdominal:      General: There is no distension.   Musculoskeletal: Normal range of motion.   Skin:     Findings: No rash.   Neurological:      Mental Status: She is alert and oriented to person, place, and time.      Gait: Gait (gait at baseline) normal.   Psychiatric:         Mood and Affect: Mood normal.           Assessment/Plan:   1. Acute otitis media, unspecified otitis media type  - amoxicillin-clavulanate (AUGMENTIN) 875-125 MG Tab; Take 1 Tab by mouth 2 times a day for 10 days.  Dispense: 20 Tab; Refill: 0  - neomycin/colistin/thonz/HC (CORTISPORIN-TC) 3.3-3-10-0.5 MG/ML Suspension; Place 3 Drops in ear 3 times a day for 10 days.  Dispense: 10 mL; Refill: 1    2. Acute otalgia, left  - neomycin/colistin/thonz/HC (CORTISPORIN-TC) 3.3-3-10-0.5 MG/ML Suspension; Place 3 Drops in ear 3 times a day for 10 days.  Dispense: 10 mL; Refill: 1       Discussed close monitoring, return precautions, and supportive measures including maintaining adequate fluid hydration and caloric intake, relative rest and OTC symptom management including acetaminophen as needed for pain and/or fever.    Differential diagnosis, natural history, supportive care, and indications for immediate follow-up discussed.     Advised the patient to follow-up with the primary care physician for recheck, reevaluation, and consideration of further management.

## 2020-04-06 DIAGNOSIS — F51.01 PRIMARY INSOMNIA: ICD-10-CM

## 2020-04-06 RX ORDER — TRAZODONE HYDROCHLORIDE 50 MG/1
TABLET ORAL
Qty: 30 TAB | Refills: 2 | Status: SHIPPED | OUTPATIENT
Start: 2020-04-06

## 2020-04-06 NOTE — TELEPHONE ENCOUNTER
Was the patient seen in the last year in this department? Yes    Does patient have an active prescription for medications requested? Yes    Received Request Via: Pharmacy    No visits with results within 1 Year(s) from this visit.   Latest known visit with results is:   Hospital Outpatient Visit on 03/29/2019   Component Date Value   • C. trachomatis by PCR 03/29/2019 Negative    • N. gonorrhoeae by PCR 03/29/2019 Negative    • Source 03/29/2019 Urine    • Significant Indicator 03/29/2019 .    • Source 03/29/2019 UR    • Site 03/29/2019 -    • Ambiguous Date/Time 03/29/2019                      Value:This specimen was received from your office without a  collection date and/or time. Collection date and/or time  defaulted to receipt date and/or time.     ]

## 2020-04-17 ENCOUNTER — OFFICE VISIT (OUTPATIENT)
Dept: MEDICAL GROUP | Facility: CLINIC | Age: 55
End: 2020-04-17
Payer: COMMERCIAL

## 2020-04-17 VITALS
DIASTOLIC BLOOD PRESSURE: 82 MMHG | OXYGEN SATURATION: 97 % | TEMPERATURE: 98.2 F | BODY MASS INDEX: 25.71 KG/M2 | HEIGHT: 67 IN | HEART RATE: 95 BPM | RESPIRATION RATE: 16 BRPM | SYSTOLIC BLOOD PRESSURE: 110 MMHG | WEIGHT: 163.8 LBS

## 2020-04-17 DIAGNOSIS — F51.01 PRIMARY INSOMNIA: ICD-10-CM

## 2020-04-17 DIAGNOSIS — J31.0 CHRONIC RHINITIS: ICD-10-CM

## 2020-04-17 DIAGNOSIS — H61.20 WAX IN EAR: ICD-10-CM

## 2020-04-17 PROCEDURE — 99213 OFFICE O/P EST LOW 20 MIN: CPT | Performed by: FAMILY MEDICINE

## 2020-04-17 RX ORDER — AZELASTINE 1 MG/ML
SPRAY, METERED NASAL
COMMUNITY
Start: 2020-03-26

## 2020-04-17 RX ORDER — TEMAZEPAM 15 MG/1
15 CAPSULE ORAL NIGHTLY PRN
Qty: 90 CAP | Refills: 0 | Status: SHIPPED | OUTPATIENT
Start: 2020-04-17 | End: 2020-07-16

## 2020-04-17 SDOH — HEALTH STABILITY: MENTAL HEALTH: HOW OFTEN DO YOU HAVE A DRINK CONTAINING ALCOHOL?: 2-3 TIMES A WEEK

## 2020-04-17 SDOH — HEALTH STABILITY: MENTAL HEALTH: HOW MANY STANDARD DRINKS CONTAINING ALCOHOL DO YOU HAVE ON A TYPICAL DAY?: 3 OR 4

## 2020-04-17 NOTE — PROGRESS NOTES
Complaint: refill sleeping med, check ears     Subjective:     Louisa Nieto is a 55 y.o. female here today for f/u.    Insomnia  Here for refill of her temazepam. Taking in combo with trazodone. Over due, last rx 4/3. Sleeping well.     Allergic rhinitis due to allergen  Followed by Dr. Ronquillo, using Astelin spray with relief. No longer wants Kenalog shots. Irrigating nasal passages daily.     Pressure in both ears, history of wax build-up.    Current medicines (including changes today)  Current Outpatient Medications   Medication Sig Dispense Refill   • temazepam (RESTORIL) 15 MG Cap Take 1 Cap by mouth at bedtime as needed for Sleep for up to 90 days. 90 Cap 0   • azelastine (ASTELIN) 137 MCG/SPRAY nasal spray      • traZODone (DESYREL) 50 MG Tab Take with temazepam for sleep 30 Tab 2   • valsartan-hydrochlorothiazide (DIOVAN-HCT) 160-25 MG per tablet Take 1 Tab by mouth every day. 90 Tab 1   • ADVAIR DISKUS 500-50 MCG/DOSE AEROSOL POWDER, BREATH ACTIVATED INHALE 1 PUFF BY MOUTH TWICE DAILY 60 Inhaler 0   • albuterol (VENTOLIN OR PROVENTIL) 108 (90 BASE) MCG/ACT Aero Soln inhalation aerosol Inhale 2 Puffs by mouth every 6 hours as needed for Shortness of Breath. 2 Inhaler 3   • albuterol (PROVENTIL) 2.5mg/3ml NEBU solution for nebulization 3 mL by Nebulization route every four hours as needed for Shortness of Breath. 150 mL 3     No current facility-administered medications for this visit.      She  has a past medical history of Asthma in adult (10/2/2013), Chronic sinusitis, Gout, HTN (hypertension) (10/2/2013), Hypertension, Insomnia, and URI (upper respiratory infection) (10/2/2013).    Health Maintenance: will get mammo and colonoscopy once Covid crisis over.      Allergies: Aspirin and Nsaids    Current Outpatient Medications Ordered in Epic   Medication Sig Dispense Refill   • temazepam (RESTORIL) 15 MG Cap Take 1 Cap by mouth at bedtime as needed for Sleep for up to 90 days. 90 Cap 0   •  azelastine (ASTELIN) 137 MCG/SPRAY nasal spray      • traZODone (DESYREL) 50 MG Tab Take with temazepam for sleep 30 Tab 2   • valsartan-hydrochlorothiazide (DIOVAN-HCT) 160-25 MG per tablet Take 1 Tab by mouth every day. 90 Tab 1   • ADVAIR DISKUS 500-50 MCG/DOSE AEROSOL POWDER, BREATH ACTIVATED INHALE 1 PUFF BY MOUTH TWICE DAILY 60 Inhaler 0   • albuterol (VENTOLIN OR PROVENTIL) 108 (90 BASE) MCG/ACT Aero Soln inhalation aerosol Inhale 2 Puffs by mouth every 6 hours as needed for Shortness of Breath. 2 Inhaler 3   • albuterol (PROVENTIL) 2.5mg/3ml NEBU solution for nebulization 3 mL by Nebulization route every four hours as needed for Shortness of Breath. 150 mL 3     No current [facility]-ordered facility-administered medications on file.        Past Medical History:   Diagnosis Date   • Asthma in adult 10/2/2013   • Chronic sinusitis    • Gout    • HTN (hypertension) 10/2/2013   • Hypertension    • Insomnia    • URI (upper respiratory infection) 10/2/2013    X 2 weeks Thick nasal congestion Can't breathe       Past Surgical History:   Procedure Laterality Date   • APPENDECTOMY     • PRIMARY C SECTION      x1       Social History     Tobacco Use   • Smoking status: Never Smoker   • Smokeless tobacco: Current User   Substance Use Topics   • Alcohol use: Yes     Alcohol/week: 1.2 - 1.8 oz     Types: 2 - 3 Cans of beer per week     Frequency: 2-3 times a week     Drinks per session: 3 or 4   • Drug use: No       Social History     Social History Narrative   • Not on file       Family History   Problem Relation Age of Onset   • Heart Disease Father    • Cancer Father         renal   • Heart Disease Paternal Grandfather          ROS Positive for discomfort in ears, stuffy nose  Patient denies any fever, chills, unintentional weight gain/loss, fatigue, stroke symptoms, dizziness, headache,  sore-throat, cough, heartburn, chest pain, difficulty breathing, abdominal discomfort, diarrhea/constipation, burning with urination or  "frequency, joint or back pain, skin rashes, depression or anxiety.       Objective:     /82 (BP Location: Left arm, Patient Position: Sitting, BP Cuff Size: Adult)   Pulse 95   Temp 36.8 °C (98.2 °F) (Temporal)   Resp 16   Ht 1.702 m (5' 7\")   Wt 74.3 kg (163 lb 12.8 oz)   SpO2 97%  Body mass index is 25.65 kg/m².   Physical Exam:  Constitutional: Alert, no distress. Husky voice.  Skin: Warm, dry, good turgor, no rashes in visible areas.  Eye: Equal, round and reactive, conjunctiva clear, lids normal.  ENMT: Lips without lesions, good dentition, oropharynx clear. Wax in left ear canal. Congested nares.        Assessment and Plan:   The following treatment plan was discussed    1. Primary insomnia  - temazepam (RESTORIL) 15 MG Cap; Take 1 Cap by mouth at bedtime as needed for Sleep for up to 90 days.  Dispense: 90 Cap; Refill: 0    2. Chronic rhinitis  Continue use Astelin.    3. Wax in ear  Use otc drops in left ear as reviewed.          Followup: Return in about 3 months (around 7/17/2020), or if symptoms worsen or fail to improve.    Please note that this dictation was created using voice recognition software. I have made every reasonable attempt to correct obvious errors, but I expect that there are errors of grammar and possibly content that I did not discover before finalizing the note.           "

## 2020-04-17 NOTE — ASSESSMENT & PLAN NOTE
Here for refill of her temazepam. Taking in combo with trazodone. Over due, last rx 4/3. Sleeping well.

## 2020-06-29 DIAGNOSIS — I10 ESSENTIAL HYPERTENSION: ICD-10-CM

## 2020-07-01 RX ORDER — VALSARTAN AND HYDROCHLOROTHIAZIDE 160; 25 MG/1; MG/1
1 TABLET ORAL
Qty: 90 TAB | Refills: 1 | Status: SHIPPED | OUTPATIENT
Start: 2020-07-01

## 2020-07-27 ENCOUNTER — OFFICE VISIT (OUTPATIENT)
Dept: URGENT CARE | Facility: PHYSICIAN GROUP | Age: 55
End: 2020-07-27
Payer: COMMERCIAL

## 2020-07-27 VITALS
BODY MASS INDEX: 23.57 KG/M2 | RESPIRATION RATE: 16 BRPM | DIASTOLIC BLOOD PRESSURE: 82 MMHG | WEIGHT: 150.2 LBS | SYSTOLIC BLOOD PRESSURE: 112 MMHG | HEART RATE: 112 BPM | HEIGHT: 67 IN | OXYGEN SATURATION: 97 % | TEMPERATURE: 99.1 F

## 2020-07-27 DIAGNOSIS — M10.061 ACUTE IDIOPATHIC GOUT OF RIGHT KNEE: ICD-10-CM

## 2020-07-27 PROCEDURE — 99214 OFFICE O/P EST MOD 30 MIN: CPT | Performed by: PHYSICIAN ASSISTANT

## 2020-07-27 RX ORDER — PREDNISONE 20 MG/1
TABLET ORAL
Qty: 10 TAB | Refills: 0 | Status: SHIPPED | OUTPATIENT
Start: 2020-07-27

## 2020-07-27 RX ORDER — COLCHICINE 0.6 MG/1
TABLET ORAL
Qty: 6 TAB | Refills: 0 | Status: SHIPPED | OUTPATIENT
Start: 2020-07-27

## 2020-07-27 ASSESSMENT — ENCOUNTER SYMPTOMS
COUGH: 0
CHILLS: 0
NUMBNESS: 0
FEVER: 0
ABDOMINAL PAIN: 0
VOMITING: 0
FALLS: 0
RESPIRATORY NEGATIVE: 1
GASTROINTESTINAL NEGATIVE: 1
NEUROLOGICAL NEGATIVE: 1
WEAKNESS: 0
JOINT SWELLING: 1
CARDIOVASCULAR NEGATIVE: 1
CONSTITUTIONAL NEGATIVE: 1

## 2020-07-27 NOTE — PROGRESS NOTES
Subjective:      Hortencia Nieto is a 55 y.o. female who presents with Gout (x6 weeks, )            Patient has history of recurrent gout in her right knee.  She was started on allopurinol 4 days ago 100 mg a day.  However an acute flare started last night.  Has had success with colchicine and prednisone in the past.    Knee Pain   This is a recurrent problem. The current episode started in the past 7 days. The problem occurs constantly. The problem has been unchanged. Associated symptoms include joint swelling. Pertinent negatives include no abdominal pain, chills, congestion, coughing, fever, numbness, rash, vomiting or weakness. The symptoms are aggravated by bending and walking. She has tried NSAIDs for the symptoms. The treatment provided mild relief.       PMH:  has a past medical history of Asthma in adult (10/2/2013), Chronic sinusitis, Gout, HTN (hypertension) (10/2/2013), Hypertension, Insomnia, and URI (upper respiratory infection) (10/2/2013).  MEDS:   Current Outpatient Medications:   •  colchicine (COLCRYS) 0.6 MG Tab, Take 2 tabs PO, then 1 tab PO 2-3 hours later, Disp: 6 Tab, Rfl: 0  •  predniSONE (DELTASONE) 20 MG Tab, Take 2 tabs PO QD x 5 days, Disp: 10 Tab, Rfl: 0  •  valsartan-hydrochlorothiazide (DIOVAN-HCT) 160-25 MG per tablet, Take 1 Tab by mouth every day., Disp: 90 Tab, Rfl: 1  •  traZODone (DESYREL) 50 MG Tab, Take with temazepam for sleep, Disp: 30 Tab, Rfl: 2  •  ADVAIR DISKUS 500-50 MCG/DOSE AEROSOL POWDER, BREATH ACTIVATED, INHALE 1 PUFF BY MOUTH TWICE DAILY, Disp: 60 Inhaler, Rfl: 0  •  albuterol (VENTOLIN OR PROVENTIL) 108 (90 BASE) MCG/ACT Aero Soln inhalation aerosol, Inhale 2 Puffs by mouth every 6 hours as needed for Shortness of Breath., Disp: 2 Inhaler, Rfl: 3  •  albuterol (PROVENTIL) 2.5mg/3ml NEBU solution for nebulization, 3 mL by Nebulization route every four hours as needed for Shortness of Breath., Disp: 150 mL, Rfl: 3  •  azelastine (ASTELIN) 137 MCG/SPRAY nasal  "spray, , Disp: , Rfl:   ALLERGIES:   Allergies   Allergen Reactions   • Aspirin    • Nsaids      SURGHX:   Past Surgical History:   Procedure Laterality Date   • APPENDECTOMY     • PRIMARY C SECTION      x1     SOCHX:  reports that she has never smoked. She uses smokeless tobacco. She reports current alcohol use of about 1.2 - 1.8 oz of alcohol per week. She reports that she does not use drugs.  FH: family history includes Cancer in her father; Heart Disease in her father and paternal grandfather.    Review of Systems   Constitutional: Negative.  Negative for chills and fever.   HENT: Negative.  Negative for congestion.    Respiratory: Negative.  Negative for cough.    Cardiovascular: Negative.    Gastrointestinal: Negative.  Negative for abdominal pain and vomiting.   Musculoskeletal: Positive for joint pain and joint swelling. Negative for falls.   Skin: Negative for rash.   Neurological: Negative.  Negative for weakness and numbness.       Medications, Allergies, and current problem list reviewed today in Epic     Objective:     /82   Pulse (!) 112   Temp 37.3 °C (99.1 °F) (Temporal)   Resp 16   Ht 1.702 m (5' 7\")   Wt 68.1 kg (150 lb 3.2 oz)   SpO2 97%   BMI 23.52 kg/m²      Physical Exam  Vitals signs and nursing note reviewed.   Constitutional:       General: She is not in acute distress.     Appearance: She is well-developed. She is not diaphoretic.   HENT:      Head: Normocephalic and atraumatic.   Eyes:      Conjunctiva/sclera: Conjunctivae normal.   Neck:      Musculoskeletal: Normal range of motion and neck supple.   Cardiovascular:      Rate and Rhythm: Normal rate and regular rhythm.      Heart sounds: Normal heart sounds.   Pulmonary:      Effort: Pulmonary effort is normal. No respiratory distress.      Breath sounds: Normal breath sounds. No wheezing, rhonchi or rales.   Musculoskeletal:      Right knee: She exhibits decreased range of motion, swelling and bony tenderness. She exhibits " no effusion, normal alignment, no LCL laxity, normal patellar mobility, normal meniscus and no MCL laxity. Tenderness found.        Legs:    Skin:     General: Skin is warm and dry.   Neurological:      Mental Status: She is alert and oriented to person, place, and time.   Psychiatric:         Behavior: Behavior normal.         Thought Content: Thought content normal.         Judgment: Judgment normal.                 Assessment/Plan:     1. Acute idiopathic gout of right knee  colchicine (COLCRYS) 0.6 MG Tab    predniSONE (DELTASONE) 20 MG Tab     No injury or precipitating event.  No fever chills.  History of recurrent gout in her knee.  She will be started on colchicine and prednisone.  Stop allopurinol now and restart after acute flare.  Follow-up with PCP.  OTC meds and conservative measures as discussed    Return to clinic or go to ED if symptoms worsen or persist. Indications for ED discussed at length. Patient voices understanding. Follow-up with your primary care provider in 3-5 days. Red flags discussed. All side effects of medication discussed including allergic response, GI upset, tendon injury, etc.    Please note that this dictation was created using voice recognition software. I have made every reasonable attempt to correct obvious errors, but I expect that there are errors of grammar and possibly content that I did not discover before finalizing the note.

## 2020-07-27 NOTE — PATIENT INSTRUCTIONS
Low-Purine Eating Plan  A low-purine eating plan involves making food choices to limit your intake of purine. Purine is a kind of uric acid. Too much uric acid in your blood can cause certain conditions, such as gout and kidney stones. Eating a low-purine diet can help control these conditions.  What are tips for following this plan?  Reading food labels    · Avoid foods with saturated or Trans fat.  · Check the ingredient list of grains-based foods, such as bread and cereal, to make sure that they contain whole grains.  · Check the ingredient list of sauces or soups to make sure they do not contain meat or fish.  · When choosing soft drinks, check the ingredient list to make sure they do not contain high-fructose corn syrup.  Shopping  · Buy plenty of fresh fruits and vegetables.  · Avoid buying canned or fresh fish.  · Buy dairy products labeled as low-fat or nonfat.  · Avoid buying premade or processed foods. These foods are often high in fat, salt (sodium), and added sugar.  Cooking  · Use olive oil instead of butter when cooking. Oils like olive oil, canola oil, and sunflower oil contain healthy fats.  Meal planning  · Learn which foods do or do not affect you. If you find out that a food tends to cause your gout symptoms to flare up, avoid eating that food. You can enjoy foods that do not cause problems. If you have any questions about a food item, talk with your dietitian or health care provider.  · Limit foods high in fat, especially saturated fat. Fat makes it harder for your body to get rid of uric acid.  · Choose foods that are lower in fat and are lean sources of protein.  General guidelines  · Limit alcohol intake to no more than 1 drink a day for nonpregnant women and 2 drinks a day for men. One drink equals 12 oz of beer, 5 oz of wine, or 1½ oz of hard liquor. Alcohol can affect the way your body gets rid of uric acid.  · Drink plenty of water to keep your urine clear or pale yellow. Fluids can help  remove uric acid from your body.  · If directed by your health care provider, take a vitamin C supplement.  · Work with your health care provider and dietitian to develop a plan to achieve or maintain a healthy weight. Losing weight can help reduce uric acid in your blood.  What foods are recommended?  The items listed may not be a complete list. Talk with your dietitian about what dietary choices are best for you.  Foods low in purines  Foods low in purines do not need to be limited. These include:  · All fruits.  · All low-purine vegetables, pickles, and olives.  · Breads, pasta, rice, cornbread, and popcorn. Cake and other baked goods.  · All dairy foods.  · Eggs, nuts, and nut butters.  · Spices and condiments, such as salt, herbs, and vinegar.  · Plant oils, butter, and margarine.  · Water, sugar-free soft drinks, tea, coffee, and cocoa.  · Vegetable-based soups, broths, sauces, and gravies.  Foods moderate in purines  Foods moderate in purines should be limited to the amounts listed.  · ½ cup of asparagus, cauliflower, spinach, mushrooms, or green peas, each day.  · 2/3 cup uncooked oatmeal, each day.  · ¼ cup dry wheat bran or wheat germ, each day.  · 2-3 ounces of meat or poultry, each day.  · 4-6 ounces of shellfish, such as crab, lobster, oysters, or shrimp, each day.  · 1 cup cooked beans, peas, or lentils, each day.  · Soup, broths, or bouillon made from meat or fish. Limit these foods as much as possible.  What foods are not recommended?  The items listed may not be a complete list. Talk with your dietitian about what dietary choices are best for you.  Limit your intake of foods high in purines, including:  · Beer and other alcohol.  · Meat-based gravy or sauce.  · Canned or fresh fish, such as:  ? Anchovies, sardines, herring, and tuna.  ? Mussels and scallops.  ? Codfish, trout, and antolin.  · Rg.  · Organ meats, such as:  ? Liver or kidney.  ? Tripe.  ? Sweetbreads (thymus gland or  pancreas).  · Wild game or goose.  · Yeast or yeast extract supplements.  · Drinks sweetened with high-fructose corn syrup.  Summary  · Eating a low-purine diet can help control conditions caused by too much uric acid in the body, such as gout or kidney stones.  · Choose low-purine foods, limit alcohol, and limit foods high in fat.  · You will learn over time which foods do or do not affect you. If you find out that a food tends to cause your gout symptoms to flare up, avoid eating that food.  This information is not intended to replace advice given to you by your health care provider. Make sure you discuss any questions you have with your health care provider.  Document Released: 04/13/2012 Document Revised: 11/30/2018 Document Reviewed: 01/31/2018  [employer] Patient Education © 2020 [employer]  Gout    Gout is a condition that causes painful swelling of the joints. Gout is a type of inflammation of the joints (arthritis). This condition is caused by having too much uric acid in the body. Uric acid is a chemical that forms when the body breaks down substances called purines. Purines are important for building body proteins.  When the body has too much uric acid, sharp crystals can form and build up inside the joints. This causes pain and swelling. Gout attacks can happen quickly and may be very painful (acute gout). Over time, the attacks can affect more joints and become more frequent (chronic gout). Gout can also cause uric acid to build up under the skin and inside the kidneys.  What are the causes?  This condition is caused by too much uric acid in your blood. This can happen because:  · Your kidneys do not remove enough uric acid from your blood. This is the most common cause.  · Your body makes too much uric acid. This can happen with some cancers and cancer treatments. It can also occur if your body is breaking down too many red blood cells (hemolytic anemia).  · You eat too many foods that are high in  purines. These foods include organ meats and some seafood. Alcohol, especially beer, is also high in purines.  A gout attack may be triggered by trauma or stress.  What increases the risk?  You are more likely to develop this condition if you:  · Have a family history of gout.  · Are male and middle-aged.  · Are female and have gone through menopause.  · Are obese.  · Frequently drink alcohol, especially beer.  · Are dehydrated.  · Lose weight too quickly.  · Have an organ transplant.  · Have lead poisoning.  · Take certain medicines, including aspirin, cyclosporine, diuretics, levodopa, and niacin.  · Have kidney disease.  · Have a skin condition called psoriasis.  What are the signs or symptoms?  An attack of acute gout happens quickly. It usually occurs in just one joint. The most common place is the big toe. Attacks often start at night. Other joints that may be affected include joints of the feet, ankle, knee, fingers, wrist, or elbow. Symptoms of this condition may include:  · Severe pain.  · Warmth.  · Swelling.  · Stiffness.  · Tenderness. The affected joint may be very painful to touch.  · Shiny, red, or purple skin.  · Chills and fever.  Chronic gout may cause symptoms more frequently. More joints may be involved. You may also have white or yellow lumps (tophi) on your hands or feet or in other areas near your joints.  How is this diagnosed?  This condition is diagnosed based on your symptoms, medical history, and physical exam. You may have tests, such as:  · Blood tests to measure uric acid levels.  · Removal of joint fluid with a thin needle (aspiration) to look for uric acid crystals.  · X-rays to look for joint damage.  How is this treated?  Treatment for this condition has two phases: treating an acute attack and preventing future attacks. Acute gout treatment may include medicines to reduce pain and swelling, including:  · NSAIDs.  · Steroids. These are strong anti-inflammatory medicines that can be  taken by mouth (orally) or injected into a joint.  · Colchicine. This medicine relieves pain and swelling when it is taken soon after an attack. It can be given by mouth or through an IV.  Preventive treatment may include:  · Daily use of smaller doses of NSAIDs or colchicine.  · Use of a medicine that reduces uric acid levels in your blood.  · Changes to your diet. You may need to see a dietitian about what to eat and drink to prevent gout.  Follow these instructions at home:  During a gout attack    · If directed, put ice on the affected area:  ? Put ice in a plastic bag.  ? Place a towel between your skin and the bag.  ? Leave the ice on for 20 minutes, 2-3 times a day.  · Raise (elevate) the affected joint above the level of your heart as often as possible.  · Rest the joint as much as possible. If the affected joint is in your leg, you may be given crutches to use.  · Follow instructions from your health care provider about eating or drinking restrictions.  Avoiding future gout attacks  · Follow a low-purine diet as told by your dietitian or health care provider. Avoid foods and drinks that are high in purines, including liver, kidney, anchovies, asparagus, herring, mushrooms, mussels, and beer.  · Maintain a healthy weight or lose weight if you are overweight. If you want to lose weight, talk with your health care provider. It is important that you do not lose weight too quickly.  · Start or maintain an exercise program as told by your health care provider.  Eating and drinking  · Drink enough fluids to keep your urine pale yellow.  · If you drink alcohol:  ? Limit how much you use to:  § 0-1 drink a day for women.  § 0-2 drinks a day for men.  ? Be aware of how much alcohol is in your drink. In the [place] one drink equals one 12 oz bottle of beer (355 mL) one 5 oz glass of wine (148 mL), or one 1½ oz glass of hard liquor (44 mL).  General instructions  · Take over-the-counter and prescription medicines only as  told by your health care provider.  · Do not drive or use heavy machinery while taking prescription pain medicine.  · Return to your normal activities as told by your health care provider. Ask your health care provider what activities are safe for you.  · Keep all follow-up visits as told by your health care provider. This is important.  Contact a health care provider if you have:  · Another gout attack.  · Continuing symptoms of a gout attack after 10 days of treatment.  · Side effects from your medicines.  · Chills or a fever.  · Burning pain when you urinate.  · Pain in your lower back or belly.  Get help right away if you:  · Have severe or uncontrolled pain.  · Cannot urinate.  Summary  · Gout is painful swelling of the joints caused by inflammation.  · The most common site of pain is the big toe, but it can affect other joints in the body.  · Medicines and dietary changes can help to prevent and treat gout attacks.  This information is not intended to replace advice given to you by your health care provider. Make sure you discuss any questions you have with your health care provider.  Document Released: 12/15/2001 Document Revised: 07/10/2019 Document Reviewed: 07/10/2019  [employer] Patient Education © 2020 [employer]

## 2020-08-05 NOTE — TELEPHONE ENCOUNTER
Received request via: Pharmacy    Was the patient seen in the last year in this department? Yes    Does the patient have an active prescription (recently filled or refills available) for medication(s) requested? No     05-Aug-2020 13:44

## 2020-11-29 ENCOUNTER — OFFICE VISIT (OUTPATIENT)
Dept: URGENT CARE | Facility: PHYSICIAN GROUP | Age: 55
End: 2020-11-29
Payer: COMMERCIAL

## 2020-11-29 ENCOUNTER — HOSPITAL ENCOUNTER (OUTPATIENT)
Facility: MEDICAL CENTER | Age: 55
End: 2020-11-29
Attending: PHYSICIAN ASSISTANT
Payer: COMMERCIAL

## 2020-11-29 VITALS
HEART RATE: 103 BPM | TEMPERATURE: 97.8 F | BODY MASS INDEX: 25.11 KG/M2 | DIASTOLIC BLOOD PRESSURE: 92 MMHG | WEIGHT: 160 LBS | OXYGEN SATURATION: 99 % | HEIGHT: 67 IN | SYSTOLIC BLOOD PRESSURE: 124 MMHG | RESPIRATION RATE: 12 BRPM

## 2020-11-29 DIAGNOSIS — R30.0 DYSURIA: ICD-10-CM

## 2020-11-29 DIAGNOSIS — N30.01 ACUTE CYSTITIS WITH HEMATURIA: ICD-10-CM

## 2020-11-29 LAB
APPEARANCE UR: NORMAL
BILIRUB UR STRIP-MCNC: NORMAL MG/DL
COLOR UR AUTO: YELLOW
GLUCOSE UR STRIP.AUTO-MCNC: NORMAL MG/DL
KETONES UR STRIP.AUTO-MCNC: NORMAL MG/DL
LEUKOCYTE ESTERASE UR QL STRIP.AUTO: NORMAL
NITRITE UR QL STRIP.AUTO: NORMAL
PH UR STRIP.AUTO: 7 [PH] (ref 5–8)
PROT UR QL STRIP: NORMAL MG/DL
RBC UR QL AUTO: NORMAL
SP GR UR STRIP.AUTO: 1.02
UROBILINOGEN UR STRIP-MCNC: NORMAL MG/DL

## 2020-11-29 PROCEDURE — 87077 CULTURE AEROBIC IDENTIFY: CPT

## 2020-11-29 PROCEDURE — 99214 OFFICE O/P EST MOD 30 MIN: CPT | Performed by: PHYSICIAN ASSISTANT

## 2020-11-29 PROCEDURE — 87086 URINE CULTURE/COLONY COUNT: CPT

## 2020-11-29 PROCEDURE — 81002 URINALYSIS NONAUTO W/O SCOPE: CPT | Performed by: PHYSICIAN ASSISTANT

## 2020-11-29 PROCEDURE — 87186 SC STD MICRODIL/AGAR DIL: CPT

## 2020-11-29 RX ORDER — NITROFURANTOIN 25; 75 MG/1; MG/1
100 CAPSULE ORAL EVERY 12 HOURS
Qty: 10 CAP | Refills: 0 | Status: SHIPPED | OUTPATIENT
Start: 2020-11-29 | End: 2020-12-04

## 2020-11-29 NOTE — PROGRESS NOTES
Chief Complaint   Patient presents with   • UTI     Frequent and constant urination. [id number]        HISTORY OF PRESENT ILLNESS: Patient is a 55 y.o. female who presents today for the following:    Patient comes in for evaluation of frequent urination and lower abdominal discomfort for the past 10 days.  She has discomfort with urination and feels a pressure in the area of her bladder.  She does report slight increase in urination.  She denies back pain, fever, nausea, vomiting.  She works as a .    Patient Active Problem List    Diagnosis Date Noted   • Vocal cord polyps 01/03/2020   • Chronic rhinitis 01/03/2020   • Other hemorrhoids 03/29/2019   • Nasal polyps 07/10/2018   • Chronic maxillary sinusitis 07/10/2018   • Allergic rhinitis due to allergen 03/23/2015   • Insomnia 11/13/2014   • HTN (hypertension) 10/02/2013   • Asthma in adult 10/02/2013       Allergies:Aspirin and Nsaids    Current Outpatient Medications Ordered in Epic   Medication Sig Dispense Refill   • nitrofurantoin (MACROBID) 100 MG Cap Take 1 Cap by mouth every 12 hours for 5 days. 10 Cap 0   • valsartan-hydrochlorothiazide (DIOVAN-HCT) 160-25 MG per tablet Take 1 Tab by mouth every day. 90 Tab 1   • ADVAIR DISKUS 500-50 MCG/DOSE AEROSOL POWDER, BREATH ACTIVATED INHALE 1 PUFF BY MOUTH TWICE DAILY 60 Inhaler 0   • albuterol (VENTOLIN OR PROVENTIL) 108 (90 BASE) MCG/ACT Aero Soln inhalation aerosol Inhale 2 Puffs by mouth every 6 hours as needed for Shortness of Breath. 2 Inhaler 3   • albuterol (PROVENTIL) 2.5mg/3ml NEBU solution for nebulization 3 mL by Nebulization route every four hours as needed for Shortness of Breath. 150 mL 3   • colchicine (COLCRYS) 0.6 MG Tab Take 2 tabs PO, then 1 tab PO 2-3 hours later (Patient not taking: Reported on 11/29/2020) 6 Tab 0   • predniSONE (DELTASONE) 20 MG Tab Take 2 tabs PO QD x 5 days (Patient not taking: Reported on 11/29/2020) 10 Tab 0   • azelastine (ASTELIN) 137 MCG/SPRAY nasal spray     "  • traZODone (DESYREL) 50 MG Tab Take with temazepam for sleep (Patient not taking: Reported on 11/29/2020) 30 Tab 2     No current Epic-ordered facility-administered medications on file.        Past Medical History:   Diagnosis Date   • Asthma in adult 10/2/2013   • Chronic sinusitis    • Gout    • HTN (hypertension) 10/2/2013   • Hypertension    • Insomnia    • URI (upper respiratory infection) 10/2/2013    X 2 weeks Thick nasal congestion Can't breathe       Social History     Tobacco Use   • Smoking status: Never Smoker   • Smokeless tobacco: Current User   Substance Use Topics   • Alcohol use: Yes     Alcohol/week: 1.2 - 1.8 oz     Types: 2 - 3 Cans of beer per week     Frequency: 2-3 times a week     Drinks per session: 3 or 4   • Drug use: No       Family Status   Relation Name Status   • Fa  (Not Specified)   • PGFa  (Not Specified)     Family History   Problem Relation Age of Onset   • Heart Disease Father    • Cancer Father         renal   • Heart Disease Paternal Grandfather        Review of Systems:   Constitutional ROS: No unexpected change in weight, No weakness, No fatigue  Pulmonary ROS: No chronic cough, sputum, or hemoptysis, No dyspnea on exertion, No wheezing  Cardiovascular ROS: No diaphoresis, No edema, No palpitations  Gastrointestinal ROS: No change in bowel habits, No significant change in appetite, No nausea, vomiting, diarrhea, or constipation  Hematologic/Lymphatic ROS: No chills, No night sweats, No weight loss  Skin/Integumentary ROS: No edema, No evidence of rash, No itching      Exam:  /92   Pulse (!) 103   Temp 36.6 °C (97.8 °F) (Temporal)   Resp 12   Ht 1.702 m (5' 7\")   Wt 72.6 kg (160 lb)   SpO2 99%   General: Well developed, well nourished. No distress.  Pulmonary: Unlabored respiratory effort.   Back: No CVA tenderness noted.  Neurologic: Grossly nonfocal. No facial asymmetry noted.  Skin: Warm, dry, good turgor. No rashes in visible areas.   Psych: Normal mood. " Alert and oriented x3. Judgment and insight is normal.    UA: Large leukocyte esterase, positive blood, otherwise negative    Assessment/Plan:  Drink plenty of fluids. Will contact patient with culture results. Use all medication as directed. Follow up for worsening or persistent symptoms.  1. Acute cystitis with hematuria  nitrofurantoin (MACROBID) 100 MG Cap    URINE CULTURE(NEW)   2. Dysuria  POCT Urinalysis

## 2020-11-30 DIAGNOSIS — N30.01 ACUTE CYSTITIS WITH HEMATURIA: ICD-10-CM

## 2020-12-03 LAB
BACTERIA UR CULT: ABNORMAL
BACTERIA UR CULT: ABNORMAL
SIGNIFICANT IND 70042: ABNORMAL
SITE SITE: ABNORMAL
SOURCE SOURCE: ABNORMAL

## 2021-03-12 ENCOUNTER — HOSPITAL ENCOUNTER (OUTPATIENT)
Dept: LAB | Facility: MEDICAL CENTER | Age: 56
End: 2021-03-12
Attending: ANESTHESIOLOGY
Payer: COMMERCIAL

## 2021-03-12 LAB
COVID ORDER STATUS COVID19: NORMAL
SARS-COV-2 RNA RESP QL NAA+PROBE: NOTDETECTED
SPECIMEN SOURCE: NORMAL

## 2021-03-12 PROCEDURE — C9803 HOPD COVID-19 SPEC COLLECT: HCPCS

## 2021-03-12 PROCEDURE — U0003 INFECTIOUS AGENT DETECTION BY NUCLEIC ACID (DNA OR RNA); SEVERE ACUTE RESPIRATORY SYNDROME CORONAVIRUS 2 (SARS-COV-2) (CORONAVIRUS DISEASE [COVID-19]), AMPLIFIED PROBE TECHNIQUE, MAKING USE OF HIGH THROUGHPUT TECHNOLOGIES AS DESCRIBED BY CMS-2020-01-R: HCPCS

## 2021-03-12 PROCEDURE — U0005 INFEC AGEN DETEC AMPLI PROBE: HCPCS
